# Patient Record
Sex: FEMALE | Race: WHITE | NOT HISPANIC OR LATINO | Employment: OTHER | RURAL
[De-identification: names, ages, dates, MRNs, and addresses within clinical notes are randomized per-mention and may not be internally consistent; named-entity substitution may affect disease eponyms.]

---

## 2020-04-01 ENCOUNTER — HISTORICAL (OUTPATIENT)
Dept: ADMINISTRATIVE | Facility: HOSPITAL | Age: 40
End: 2020-04-01

## 2020-04-21 ENCOUNTER — HISTORICAL (OUTPATIENT)
Dept: ADMINISTRATIVE | Facility: HOSPITAL | Age: 40
End: 2020-04-21

## 2020-08-21 ENCOUNTER — HISTORICAL (OUTPATIENT)
Dept: ADMINISTRATIVE | Facility: HOSPITAL | Age: 40
End: 2020-08-21

## 2020-09-03 ENCOUNTER — HISTORICAL (OUTPATIENT)
Dept: ADMINISTRATIVE | Facility: HOSPITAL | Age: 40
End: 2020-09-03

## 2022-07-21 ENCOUNTER — HOSPITAL ENCOUNTER (EMERGENCY)
Facility: HOSPITAL | Age: 42
Discharge: HOME | End: 2022-07-22
Attending: EMERGENCY MEDICINE
Payer: MEDICAID

## 2022-07-21 DIAGNOSIS — K59.04 CHRONIC IDIOPATHIC CONSTIPATION: ICD-10-CM

## 2022-07-21 DIAGNOSIS — N30.00 ACUTE CYSTITIS WITHOUT HEMATURIA: ICD-10-CM

## 2022-07-21 DIAGNOSIS — N20.0 BILATERAL KIDNEY STONES: ICD-10-CM

## 2022-07-21 DIAGNOSIS — R10.84 GENERALIZED ABDOMINAL PAIN: Primary | ICD-10-CM

## 2022-07-21 LAB
ALBUMIN SERPL BCP-MCNC: 2.7 G/DL (ref 3.5–5)
ALBUMIN/GLOB SERPL: 0.8 {RATIO}
ALP SERPL-CCNC: 146 U/L (ref 37–98)
ALT SERPL W P-5'-P-CCNC: 22 U/L (ref 13–56)
AMYLASE SERPL-CCNC: 16 U/L (ref 25–115)
ANION GAP SERPL CALCULATED.3IONS-SCNC: 10 MMOL/L (ref 7–16)
AST SERPL W P-5'-P-CCNC: 26 U/L (ref 15–37)
B-HCG UR QL: NEGATIVE
BACTERIA #/AREA URNS HPF: ABNORMAL /HPF
BASOPHILS # BLD AUTO: 0.04 K/UL (ref 0–0.2)
BASOPHILS NFR BLD AUTO: 0.7 % (ref 0–1)
BILIRUB SERPL-MCNC: 0.3 MG/DL (ref 0–1.2)
BILIRUB UR QL STRIP: NEGATIVE
BUN SERPL-MCNC: 10 MG/DL (ref 7–18)
BUN/CREAT SERPL: 18 (ref 6–20)
CALCIUM SERPL-MCNC: 8.2 MG/DL (ref 8.5–10.1)
CHLORIDE SERPL-SCNC: 104 MMOL/L (ref 98–107)
CLARITY UR: ABNORMAL
CO2 SERPL-SCNC: 28 MMOL/L (ref 21–32)
COLOR UR: YELLOW
CREAT SERPL-MCNC: 0.56 MG/DL (ref 0.55–1.02)
CTP QC/QA: YES
DIFFERENTIAL METHOD BLD: ABNORMAL
EOSINOPHIL # BLD AUTO: 0.06 K/UL (ref 0–0.5)
EOSINOPHIL NFR BLD AUTO: 1.1 % (ref 1–4)
ERYTHROCYTE [DISTWIDTH] IN BLOOD BY AUTOMATED COUNT: 13.4 % (ref 11.5–14.5)
GLOBULIN SER-MCNC: 3.3 G/DL (ref 2–4)
GLUCOSE SERPL-MCNC: 124 MG/DL (ref 74–106)
GLUCOSE UR STRIP-MCNC: NORMAL MG/DL
HCT VFR BLD AUTO: 38.3 % (ref 38–47)
HGB BLD-MCNC: 12.9 G/DL (ref 12–16)
IMM GRANULOCYTES # BLD AUTO: 0.06 K/UL (ref 0–0.04)
IMM GRANULOCYTES NFR BLD: 1.1 % (ref 0–0.4)
KETONES UR STRIP-SCNC: NEGATIVE MG/DL
LEUKOCYTE ESTERASE UR QL STRIP: ABNORMAL
LIPASE SERPL-CCNC: 95 U/L (ref 73–393)
LYMPHOCYTES # BLD AUTO: 0.85 K/UL (ref 1–4.8)
LYMPHOCYTES NFR BLD AUTO: 15.3 % (ref 27–41)
MCH RBC QN AUTO: 28.5 PG (ref 27–31)
MCHC RBC AUTO-ENTMCNC: 33.7 G/DL (ref 32–36)
MCV RBC AUTO: 84.5 FL (ref 80–96)
MONOCYTES # BLD AUTO: 0.33 K/UL (ref 0–0.8)
MONOCYTES NFR BLD AUTO: 5.9 % (ref 2–6)
MPC BLD CALC-MCNC: 13 FL (ref 9.4–12.4)
MUCOUS, UA: ABNORMAL /LPF
NEUTROPHILS # BLD AUTO: 4.21 K/UL (ref 1.8–7.7)
NEUTROPHILS NFR BLD AUTO: 75.9 % (ref 53–65)
NITRITE UR QL STRIP: POSITIVE
NRBC # BLD AUTO: 0 X10E3/UL
NRBC, AUTO (.00): 0 %
PH UR STRIP: 6.5 PH UNITS
PLATELET # BLD AUTO: 144 K/UL (ref 150–400)
POTASSIUM SERPL-SCNC: 3.5 MMOL/L (ref 3.5–5.1)
PROT SERPL-MCNC: 6 G/DL (ref 6.4–8.2)
PROT UR QL STRIP: NEGATIVE
RBC # BLD AUTO: 4.53 M/UL (ref 4.2–5.4)
RBC # UR STRIP: NEGATIVE /UL
RBC #/AREA URNS HPF: 1 /HPF
SODIUM SERPL-SCNC: 138 MMOL/L (ref 136–145)
SP GR UR STRIP: 1.01
SQUAMOUS #/AREA URNS LPF: ABNORMAL /HPF
UROBILINOGEN UR STRIP-ACNC: NORMAL MG/DL
WBC # BLD AUTO: 5.55 K/UL (ref 4.5–11)
WBC #/AREA URNS HPF: 5 /HPF

## 2022-07-21 PROCEDURE — 80053 COMPREHEN METABOLIC PANEL: CPT | Performed by: EMERGENCY MEDICINE

## 2022-07-21 PROCEDURE — 99285 EMERGENCY DEPT VISIT HI MDM: CPT | Mod: 25

## 2022-07-21 PROCEDURE — 85025 COMPLETE CBC W/AUTO DIFF WBC: CPT | Performed by: EMERGENCY MEDICINE

## 2022-07-21 PROCEDURE — 99283 PR EMERGENCY DEPT VISIT,LEVEL III: ICD-10-PCS | Mod: ,,, | Performed by: EMERGENCY MEDICINE

## 2022-07-21 PROCEDURE — 81001 URINALYSIS AUTO W/SCOPE: CPT | Mod: 59 | Performed by: EMERGENCY MEDICINE

## 2022-07-21 PROCEDURE — 25000003 PHARM REV CODE 250: Performed by: EMERGENCY MEDICINE

## 2022-07-21 PROCEDURE — 96365 THER/PROPH/DIAG IV INF INIT: CPT

## 2022-07-21 PROCEDURE — 80307 DRUG TEST PRSMV CHEM ANLYZR: CPT | Performed by: EMERGENCY MEDICINE

## 2022-07-21 PROCEDURE — 83690 ASSAY OF LIPASE: CPT | Performed by: EMERGENCY MEDICINE

## 2022-07-21 PROCEDURE — 99283 EMERGENCY DEPT VISIT LOW MDM: CPT | Mod: ,,, | Performed by: EMERGENCY MEDICINE

## 2022-07-21 PROCEDURE — 82150 ASSAY OF AMYLASE: CPT | Performed by: EMERGENCY MEDICINE

## 2022-07-21 PROCEDURE — 63600175 PHARM REV CODE 636 W HCPCS: Performed by: EMERGENCY MEDICINE

## 2022-07-21 PROCEDURE — 96375 TX/PRO/DX INJ NEW DRUG ADDON: CPT

## 2022-07-21 PROCEDURE — 81025 URINE PREGNANCY TEST: CPT | Performed by: EMERGENCY MEDICINE

## 2022-07-21 PROCEDURE — 36415 COLL VENOUS BLD VENIPUNCTURE: CPT | Performed by: EMERGENCY MEDICINE

## 2022-07-21 RX ORDER — ACETAMINOPHEN 500 MG
1000 TABLET ORAL
Status: COMPLETED | OUTPATIENT
Start: 2022-07-21 | End: 2022-07-21

## 2022-07-21 RX ORDER — ONDANSETRON 2 MG/ML
8 INJECTION INTRAMUSCULAR; INTRAVENOUS
Status: COMPLETED | OUTPATIENT
Start: 2022-07-21 | End: 2022-07-21

## 2022-07-21 RX ORDER — HYDROCHLOROTHIAZIDE 25 MG/1
25 TABLET ORAL DAILY
COMMUNITY
End: 2023-06-29 | Stop reason: SDUPTHER

## 2022-07-21 RX ORDER — POLYETHYLENE GLYCOL 3350 17 G/17G
17 POWDER, FOR SOLUTION ORAL ONCE
Status: COMPLETED | OUTPATIENT
Start: 2022-07-21 | End: 2022-07-21

## 2022-07-21 RX ORDER — DOCUSATE SODIUM 100 MG/1
100 CAPSULE, LIQUID FILLED ORAL ONCE
Status: COMPLETED | OUTPATIENT
Start: 2022-07-21 | End: 2022-07-21

## 2022-07-21 RX ADMIN — ONDANSETRON 8 MG: 2 INJECTION INTRAMUSCULAR; INTRAVENOUS at 09:07

## 2022-07-21 RX ADMIN — ACETAMINOPHEN 1000 MG: 500 TABLET ORAL at 09:07

## 2022-07-21 RX ADMIN — POLYETHYLENE GLYCOL 3350 17 G: 17 POWDER, FOR SOLUTION ORAL at 11:07

## 2022-07-21 RX ADMIN — DOCUSATE SODIUM 100 MG: 100 CAPSULE, LIQUID FILLED ORAL at 11:07

## 2022-07-22 ENCOUNTER — TELEPHONE (OUTPATIENT)
Dept: EMERGENCY MEDICINE | Facility: HOSPITAL | Age: 42
End: 2022-07-22
Payer: MEDICAID

## 2022-07-22 VITALS
HEART RATE: 100 BPM | TEMPERATURE: 99 F | DIASTOLIC BLOOD PRESSURE: 81 MMHG | HEIGHT: 62 IN | BODY MASS INDEX: 47.66 KG/M2 | SYSTOLIC BLOOD PRESSURE: 123 MMHG | WEIGHT: 259 LBS | RESPIRATION RATE: 20 BRPM | OXYGEN SATURATION: 97 %

## 2022-07-22 LAB
AMPHET UR QL SCN: POSITIVE
BARBITURATES UR QL SCN: NEGATIVE
BENZODIAZ METAB UR QL SCN: NEGATIVE
CANNABINOIDS UR QL SCN: NEGATIVE
COCAINE UR QL SCN: NEGATIVE
OPIATES UR QL SCN: NEGATIVE
PCP UR QL SCN: NEGATIVE

## 2022-07-22 PROCEDURE — 25000003 PHARM REV CODE 250: Performed by: EMERGENCY MEDICINE

## 2022-07-22 PROCEDURE — 63600175 PHARM REV CODE 636 W HCPCS: Performed by: EMERGENCY MEDICINE

## 2022-07-22 RX ORDER — DOCUSATE SODIUM 100 MG/1
100 CAPSULE, LIQUID FILLED ORAL 2 TIMES DAILY PRN
Qty: 60 CAPSULE | Refills: 0 | Status: SHIPPED | OUTPATIENT
Start: 2022-07-22 | End: 2022-08-21

## 2022-07-22 RX ORDER — ONDANSETRON 4 MG/1
8 TABLET, ORALLY DISINTEGRATING ORAL EVERY 8 HOURS PRN
Qty: 15 TABLET | Refills: 0 | Status: SHIPPED | OUTPATIENT
Start: 2022-07-22 | End: 2022-07-27

## 2022-07-22 RX ADMIN — CEFTRIAXONE SODIUM 1 G: 1 INJECTION, POWDER, FOR SOLUTION INTRAMUSCULAR; INTRAVENOUS at 12:07

## 2022-07-22 NOTE — ED PROVIDER NOTES
Encounter Date: 7/21/2022       History     Chief Complaint   Patient presents with    Abdominal Pain     X 4 days     Constipation     X 3 days - ems called - denies trauma      41 y.o. female presented to the ED via EMS c/o generalized abdominal pain since 3 days. Pt reports she has not had a bowel movement in 4 days and does have problems with chronic constipation. Pt reports the pain feels like the pain she had when she had kidney stones in the past. PT denies any hematuria but reports dysuria. PT denies any chest pain, vomiting, fever, SOB, sore throat, runny nose, or any other complaints at this time.         Review of patient's allergies indicates:  No Known Allergies  Past Medical History:   Diagnosis Date    Hypertension      History reviewed. No pertinent surgical history.  History reviewed. No pertinent family history.  Social History     Tobacco Use    Smoking status: Current Every Day Smoker    Smokeless tobacco: Never Used   Substance Use Topics    Alcohol use: Not Currently     Review of Systems   Constitutional: Negative for chills and fever.   HENT: Negative for congestion, hearing loss and trouble swallowing.    Eyes: Negative for visual disturbance.   Respiratory: Negative for cough and shortness of breath.    Cardiovascular: Negative for chest pain, palpitations and leg swelling.   Gastrointestinal: Positive for abdominal pain, constipation and nausea. Negative for blood in stool, diarrhea and vomiting.   Genitourinary: Positive for dysuria. Negative for difficulty urinating and hematuria.   Musculoskeletal: Negative for back pain and myalgias.   Skin: Negative for rash.   Neurological: Negative for dizziness, light-headedness and headaches.   Psychiatric/Behavioral: Negative for sleep disturbance. The patient is not nervous/anxious.        Physical Exam     Initial Vitals   BP Pulse Resp Temp SpO2   07/21/22 2043 07/21/22 2043 07/21/22 2052 07/21/22 2047 07/21/22 2043   131/84 (!) 111 (!)  26 98.7 °F (37.1 °C) 95 %      MAP       --                Physical Exam    Vitals reviewed.  Constitutional: She appears well-developed and well-nourished. She is not diaphoretic. No distress.   HENT:   Head: Normocephalic and atraumatic.   Right Ear: External ear normal.   Left Ear: External ear normal.   Nose: Nose normal.   Mouth/Throat: Oropharynx is clear and moist.   Eyes: Conjunctivae and EOM are normal.   Neck: Neck supple.   Normal range of motion.  Cardiovascular: Normal rate and regular rhythm.   Pulmonary/Chest: Breath sounds normal. No respiratory distress.   Abdominal: Abdomen is soft. Bowel sounds are normal. She exhibits no distension. There is abdominal tenderness (generalized to palpation). There is no rebound and no guarding.   Genitourinary:    Pelvic exam was performed with patient supine.   There is no rash, tenderness, lesion or injury on the right labia. There is no rash, tenderness, lesion or injury on the left labia.    No vaginal discharge.     Musculoskeletal:         General: No edema. Normal range of motion.      Cervical back: Normal range of motion and neck supple.     Neurological: She is alert and oriented to person, place, and time. She has normal strength. No sensory deficit. GCS score is 15. GCS eye subscore is 4. GCS verbal subscore is 5. GCS motor subscore is 6.   Skin: Skin is warm and dry. Capillary refill takes less than 2 seconds.   Psychiatric: She has a normal mood and affect. Her behavior is normal. Judgment and thought content normal.         Medical Screening Exam   See Full Note    ED Course   Procedures  Labs Reviewed   URINALYSIS, REFLEX TO URINE CULTURE - Abnormal; Notable for the following components:       Result Value    Leukocytes, UA Small (*)     Nitrites, UA Positive (*)     All other components within normal limits   COMPREHENSIVE METABOLIC PANEL - Abnormal; Notable for the following components:    Glucose 124 (*)     Calcium 8.2 (*)     Total Protein 6.0  (*)     Albumin 2.7 (*)     Alk Phos 146 (*)     All other components within normal limits   AMYLASE - Abnormal; Notable for the following components:    Amylase 16 (*)     All other components within normal limits   CBC WITH DIFFERENTIAL - Abnormal; Notable for the following components:    Platelet Count 144 (*)     MPV 13.0 (*)     Neutrophils % 75.9 (*)     Lymphocytes % 15.3 (*)     Immature Granulocytes % 1.1 (*)     Lymphocytes, Absolute 0.85 (*)     Immature Granulocytes, Absolute 0.06 (*)     All other components within normal limits   DRUG SCREEN, URINE (BEAKER) - Abnormal; Notable for the following components:    Amphetamine Positive (*)     All other components within normal limits    Narrative:     The results of screening tests should be considered presumptive. Confirmatory testing is available upon request.    Cutoff Points:  PCP:         25ng/mL  AMPH:        500ng/mL  JULIUS:        200ng/mL  ANGELICA:        200ng/mL  THC:         50ng/mL  ERIKA:         300ng/mL  OPI:         2000ng/mL   URINALYSIS, MICROSCOPIC - Abnormal; Notable for the following components:    Bacteria, UA Occasional (*)     Squamous Epithelial Cells, UA Occasional (*)     Mucous Occasional (*)     All other components within normal limits   LIPASE - Normal   CBC W/ AUTO DIFFERENTIAL    Narrative:     The following orders were created for panel order CBC auto differential.  Procedure                               Abnormality         Status                     ---------                               -----------         ------                     CBC with Differential[[id number]]        Abnormal            Final result                 Please view results for these tests on the individual orders.   EXTRA TUBES    Narrative:     The following orders were created for panel order EXTRA TUBES.  Procedure                               Abnormality         Status                     ---------                               -----------          ------                     Light Blue Top Hold[[id number]]                              In process                 Light Green Top Hold[[id number]]                             In process                 Lavender Top Hold[[id number]]                                In process                 Gold Top Hold[[id number]]                                    In process                   Please view results for these tests on the individual orders.   LIGHT BLUE TOP HOLD   LIGHT GREEN TOP HOLD   LAVENDER TOP HOLD   GOLD TOP HOLD   POCT URINE PREGNANCY          Imaging Results          CT Renal Stone Study ABD Pelvis WO (In process)  Result time 07/21/22 21:49:02                 Medications   cefTRIAXone (ROCEPHIN) 1 g in dextrose 5 % in water (D5W) 5 % 50 mL IVPB (MB+) (1 g Intravenous New Bag 7/22/22 0014)   acetaminophen tablet 1,000 mg (1,000 mg Oral Given 7/21/22 2139)   ondansetron injection 8 mg (8 mg Intravenous Given 7/21/22 2139)   polyethylene glycol packet 17 g (17 g Oral Given 7/21/22 2333)   docusate sodium capsule 100 mg (100 mg Oral Given 7/21/22 2333)        Additional MDM:   Comments: Make sure to drink plenty of fluids and eat foods with high fiber. Take miralax and colace everyday until soft stools. Take tylenol OTC for renal colic. Make sure to follow up with your PCP Dr. Burrell in 3-5 days. Return to the ED if new or worsening symptoms onset. .          Attending Attestation:   Physician Attestation Statement for Resident:  As the supervising MD  I agree with the above history. -:   As the supervising MD I agree with the above PE.    As the supervising MD I agree with the above treatment, course, plan, and disposition.                      Clinical Impression:   Final diagnoses:  [R10.84] Generalized abdominal pain (Primary)  [K59.04] Chronic idiopathic constipation  [N20.0] Bilateral kidney stones  [N30.00] Acute cystitis without hematuria          ED Disposition Condition    Discharge Stable        ED  Prescriptions     Medication Sig Dispense Start Date End Date Auth. Provider    ondansetron (ZOFRAN-ODT) 4 MG TbDL Take 2 tablets (8 mg total) by mouth every 8 (eight) hours as needed (nausea). 15 tablet 7/22/2022 7/27/2022 Maryjo Martinez DO    docusate sodium (COLACE) 100 MG capsule Take 1 capsule (100 mg total) by mouth 2 (two) times daily as needed for Constipation. 60 capsule 7/22/2022 8/21/2022 Maryoj Martinez DO        Follow-up Information     Follow up With Specialties Details Why Contact Info    PCP Dr. Burrell  Schedule an appointment as soon as possible for a visit in 3 days As needed, If symptoms worsen            Maryjo Martinez DO  Resident  07/22/22 0013       Scar Koo MD  07/22/22 0029

## 2022-07-22 NOTE — DISCHARGE INSTRUCTIONS
Make sure to drink plenty of fluids and eat foods with high fiber. Take miralax and colace everyday until soft stools. Take tylenol OTC for renal colic. Make sure to follow up with your PCP Dr. Burrell in 3-5 days. Return to the ED if new or worsening symptoms onset.

## 2023-06-29 ENCOUNTER — OFFICE VISIT (OUTPATIENT)
Dept: FAMILY MEDICINE | Facility: CLINIC | Age: 43
End: 2023-06-29
Payer: MEDICAID

## 2023-06-29 VITALS
RESPIRATION RATE: 18 BRPM | DIASTOLIC BLOOD PRESSURE: 78 MMHG | TEMPERATURE: 99 F | HEIGHT: 62 IN | WEIGHT: 246 LBS | SYSTOLIC BLOOD PRESSURE: 124 MMHG | BODY MASS INDEX: 45.27 KG/M2 | HEART RATE: 126 BPM

## 2023-06-29 DIAGNOSIS — Z12.31 ENCOUNTER FOR SCREENING MAMMOGRAM FOR MALIGNANT NEOPLASM OF BREAST: ICD-10-CM

## 2023-06-29 DIAGNOSIS — Z86.39 HX OF DIABETES MELLITUS: ICD-10-CM

## 2023-06-29 DIAGNOSIS — I10 HYPERTENSION, UNSPECIFIED TYPE: Primary | ICD-10-CM

## 2023-06-29 LAB
ALBUMIN SERPL BCP-MCNC: 4.1 G/DL (ref 3.5–5)
ALBUMIN/GLOB SERPL: 0.9 {RATIO}
ALP SERPL-CCNC: 165 U/L (ref 37–98)
ALT SERPL W P-5'-P-CCNC: 30 U/L (ref 13–56)
ANION GAP SERPL CALCULATED.3IONS-SCNC: 11 MMOL/L (ref 7–16)
AST SERPL W P-5'-P-CCNC: 17 U/L (ref 15–37)
BASOPHILS # BLD AUTO: 0.13 K/UL (ref 0–0.2)
BASOPHILS NFR BLD AUTO: 1.2 % (ref 0–1)
BILIRUB SERPL-MCNC: 0.4 MG/DL (ref ?–1.2)
BUN SERPL-MCNC: 16 MG/DL (ref 7–18)
BUN/CREAT SERPL: 16 (ref 6–20)
CALCIUM SERPL-MCNC: 9.7 MG/DL (ref 8.5–10.1)
CHLORIDE SERPL-SCNC: 106 MMOL/L (ref 98–107)
CHOLEST SERPL-MCNC: 195 MG/DL (ref 0–200)
CHOLEST/HDLC SERPL: 4.4 {RATIO}
CO2 SERPL-SCNC: 28 MMOL/L (ref 21–32)
CREAT SERPL-MCNC: 1.02 MG/DL (ref 0.55–1.02)
CREAT UR-MCNC: 222 MG/DL (ref 28–219)
DIFFERENTIAL METHOD BLD: ABNORMAL
EGFR (NO RACE VARIABLE) (RUSH/TITUS): 71 ML/MIN/1.73M2
EOSINOPHIL # BLD AUTO: 0.42 K/UL (ref 0–0.5)
EOSINOPHIL NFR BLD AUTO: 3.8 % (ref 1–4)
ERYTHROCYTE [DISTWIDTH] IN BLOOD BY AUTOMATED COUNT: 13.2 % (ref 11.5–14.5)
EST. AVERAGE GLUCOSE BLD GHB EST-MCNC: 130 MG/DL
GLOBULIN SER-MCNC: 4.4 G/DL (ref 2–4)
GLUCOSE SERPL-MCNC: 126 MG/DL (ref 74–106)
HBA1C MFR BLD HPLC: 6.5 % (ref 4.5–6.6)
HCT VFR BLD AUTO: 44.6 % (ref 38–47)
HDLC SERPL-MCNC: 44 MG/DL (ref 40–60)
HGB BLD-MCNC: 14.3 G/DL (ref 12–16)
IMM GRANULOCYTES # BLD AUTO: 0.03 K/UL (ref 0–0.04)
IMM GRANULOCYTES NFR BLD: 0.3 % (ref 0–0.4)
LDLC SERPL CALC-MCNC: 99 MG/DL
LDLC/HDLC SERPL: 2.3 {RATIO}
LYMPHOCYTES # BLD AUTO: 3.27 K/UL (ref 1–4.8)
LYMPHOCYTES NFR BLD AUTO: 29.8 % (ref 27–41)
MCH RBC QN AUTO: 28 PG (ref 27–31)
MCHC RBC AUTO-ENTMCNC: 32.1 G/DL (ref 32–36)
MCV RBC AUTO: 87.5 FL (ref 80–96)
MICROALBUMIN UR-MCNC: 9.6 MG/DL (ref 0–2.8)
MICROALBUMIN/CREAT RATIO PNL UR: 43.2 MG/G (ref 0–30)
MONOCYTES # BLD AUTO: 0.81 K/UL (ref 0–0.8)
MONOCYTES NFR BLD AUTO: 7.4 % (ref 2–6)
MPC BLD CALC-MCNC: 12.1 FL (ref 9.4–12.4)
NEUTROPHILS # BLD AUTO: 6.32 K/UL (ref 1.8–7.7)
NEUTROPHILS NFR BLD AUTO: 57.5 % (ref 53–65)
NONHDLC SERPL-MCNC: 151 MG/DL
NRBC # BLD AUTO: 0 X10E3/UL
NRBC, AUTO (.00): 0 %
PLATELET # BLD AUTO: 314 K/UL (ref 150–400)
POTASSIUM SERPL-SCNC: 3.4 MMOL/L (ref 3.5–5.1)
PROT SERPL-MCNC: 8.5 G/DL (ref 6.4–8.2)
RBC # BLD AUTO: 5.1 M/UL (ref 4.2–5.4)
SODIUM SERPL-SCNC: 142 MMOL/L (ref 136–145)
TRIGL SERPL-MCNC: 259 MG/DL (ref 35–150)
VLDLC SERPL-MCNC: 52 MG/DL
WBC # BLD AUTO: 10.98 K/UL (ref 4.5–11)

## 2023-06-29 PROCEDURE — 3008F BODY MASS INDEX DOCD: CPT | Mod: CPTII,,, | Performed by: NURSE PRACTITIONER

## 2023-06-29 PROCEDURE — 99214 OFFICE O/P EST MOD 30 MIN: CPT | Mod: ,,, | Performed by: NURSE PRACTITIONER

## 2023-06-29 PROCEDURE — 1160F RVW MEDS BY RX/DR IN RCRD: CPT | Mod: CPTII,,, | Performed by: NURSE PRACTITIONER

## 2023-06-29 PROCEDURE — 83036 HEMOGLOBIN GLYCOSYLATED A1C: CPT | Mod: ,,, | Performed by: CLINICAL MEDICAL LABORATORY

## 2023-06-29 PROCEDURE — 82570 MICROALBUMIN / CREATININE RATIO URINE: ICD-10-PCS | Mod: ,,, | Performed by: CLINICAL MEDICAL LABORATORY

## 2023-06-29 PROCEDURE — 80061 LIPID PANEL: CPT | Mod: ,,, | Performed by: CLINICAL MEDICAL LABORATORY

## 2023-06-29 PROCEDURE — 1159F MED LIST DOCD IN RCRD: CPT | Mod: CPTII,,, | Performed by: NURSE PRACTITIONER

## 2023-06-29 PROCEDURE — 82043 UR ALBUMIN QUANTITATIVE: CPT | Mod: ,,, | Performed by: CLINICAL MEDICAL LABORATORY

## 2023-06-29 PROCEDURE — 3074F PR MOST RECENT SYSTOLIC BLOOD PRESSURE < 130 MM HG: ICD-10-PCS | Mod: CPTII,,, | Performed by: NURSE PRACTITIONER

## 2023-06-29 PROCEDURE — 3074F SYST BP LT 130 MM HG: CPT | Mod: CPTII,,, | Performed by: NURSE PRACTITIONER

## 2023-06-29 PROCEDURE — 85025 COMPLETE CBC W/AUTO DIFF WBC: CPT | Mod: ,,, | Performed by: CLINICAL MEDICAL LABORATORY

## 2023-06-29 PROCEDURE — 85025 CBC WITH DIFFERENTIAL: ICD-10-PCS | Mod: ,,, | Performed by: CLINICAL MEDICAL LABORATORY

## 2023-06-29 PROCEDURE — 80053 COMPREHENSIVE METABOLIC PANEL: ICD-10-PCS | Mod: ,,, | Performed by: CLINICAL MEDICAL LABORATORY

## 2023-06-29 PROCEDURE — 1160F PR REVIEW ALL MEDS BY PRESCRIBER/CLIN PHARMACIST DOCUMENTED: ICD-10-PCS | Mod: CPTII,,, | Performed by: NURSE PRACTITIONER

## 2023-06-29 PROCEDURE — 83036 HEMOGLOBIN A1C: ICD-10-PCS | Mod: ,,, | Performed by: CLINICAL MEDICAL LABORATORY

## 2023-06-29 PROCEDURE — 3008F PR BODY MASS INDEX (BMI) DOCUMENTED: ICD-10-PCS | Mod: CPTII,,, | Performed by: NURSE PRACTITIONER

## 2023-06-29 PROCEDURE — 3078F PR MOST RECENT DIASTOLIC BLOOD PRESSURE < 80 MM HG: ICD-10-PCS | Mod: CPTII,,, | Performed by: NURSE PRACTITIONER

## 2023-06-29 PROCEDURE — 99214 PR OFFICE/OUTPT VISIT, EST, LEVL IV, 30-39 MIN: ICD-10-PCS | Mod: ,,, | Performed by: NURSE PRACTITIONER

## 2023-06-29 PROCEDURE — 82043 MICROALBUMIN / CREATININE RATIO URINE: ICD-10-PCS | Mod: ,,, | Performed by: CLINICAL MEDICAL LABORATORY

## 2023-06-29 PROCEDURE — 3078F DIAST BP <80 MM HG: CPT | Mod: CPTII,,, | Performed by: NURSE PRACTITIONER

## 2023-06-29 PROCEDURE — 82570 ASSAY OF URINE CREATININE: CPT | Mod: ,,, | Performed by: CLINICAL MEDICAL LABORATORY

## 2023-06-29 PROCEDURE — 80061 LIPID PANEL: ICD-10-PCS | Mod: ,,, | Performed by: CLINICAL MEDICAL LABORATORY

## 2023-06-29 PROCEDURE — 1159F PR MEDICATION LIST DOCUMENTED IN MEDICAL RECORD: ICD-10-PCS | Mod: CPTII,,, | Performed by: NURSE PRACTITIONER

## 2023-06-29 PROCEDURE — 80053 COMPREHEN METABOLIC PANEL: CPT | Mod: ,,, | Performed by: CLINICAL MEDICAL LABORATORY

## 2023-06-29 RX ORDER — HYDROCHLOROTHIAZIDE 25 MG/1
25 TABLET ORAL DAILY
Qty: 30 TABLET | Refills: 2 | Status: SHIPPED | OUTPATIENT
Start: 2023-06-29

## 2023-06-29 NOTE — Clinical Note
Hepatitis C Screening Decline Lipid Panel Awaiting completion COVID-19 Vaccine(1) Decline Pneumococcal Vaccines (Age 0-64)(1 - PCV) Decline HIV Screening Decline TETANUS VACCINE decline Mammogram To be scheduled Hemoglobin A1c Waiting Completion
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
## 2023-12-14 ENCOUNTER — HOSPITAL ENCOUNTER (EMERGENCY)
Facility: HOSPITAL | Age: 43
Discharge: HOME | End: 2023-12-14
Payer: MEDICAID

## 2023-12-14 VITALS
WEIGHT: 250 LBS | HEART RATE: 105 BPM | SYSTOLIC BLOOD PRESSURE: 146 MMHG | HEIGHT: 62 IN | TEMPERATURE: 99 F | RESPIRATION RATE: 18 BRPM | BODY MASS INDEX: 46.01 KG/M2 | DIASTOLIC BLOOD PRESSURE: 86 MMHG | OXYGEN SATURATION: 96 %
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
## 2023-12-14 PROCEDURE — 99284 PR EMERGENCY DEPT VISIT,LEVEL IV: ICD-10-PCS | Mod: 25,,, | Performed by: REGISTERED NURSE

## 2023-12-14 PROCEDURE — 12031 INTMD RPR S/A/T/EXT 2.5 CM/<: CPT

## 2023-12-14 PROCEDURE — 99283 EMERGENCY DEPT VISIT LOW MDM: CPT | Mod: 25

## 2023-12-14 PROCEDURE — 25000003 PHARM REV CODE 250: Performed by: REGISTERED NURSE

## 2023-12-14 PROCEDURE — 12001 PR RESUPERF WND BODY <2.5CM: ICD-10-PCS | Mod: ,,, | Performed by: REGISTERED NURSE

## 2023-12-14 PROCEDURE — 99284 EMERGENCY DEPT VISIT MOD MDM: CPT | Mod: 25,,, | Performed by: REGISTERED NURSE

## 2023-12-14 PROCEDURE — 12001 RPR S/N/AX/GEN/TRNK 2.5CM/<: CPT | Mod: ,,, | Performed by: REGISTERED NURSE

## 2023-12-14 RX ORDER — AMOXICILLIN AND CLAVULANATE POTASSIUM 875; 125 MG/1; MG/1
1 TABLET, FILM COATED ORAL 2 TIMES DAILY
Qty: 14 TABLET | Refills: 0 | Status: SHIPPED | OUTPATIENT
Start: 2023-12-14

## 2023-12-14 RX ORDER — LIDOCAINE HYDROCHLORIDE 10 MG/ML
1 INJECTION INFILTRATION; PERINEURAL
Status: COMPLETED | OUTPATIENT
Start: 2023-12-14 | End: 2023-12-14

## 2023-12-14 RX ADMIN — LIDOCAINE HYDROCHLORIDE 1 ML: 10 INJECTION, SOLUTION INFILTRATION; PERINEURAL at 11:12

## 2023-12-14 RX ADMIN — BACITRACIN ZINC, NEOMYCIN, POLYMYXIN B 1 EACH: 400; 3.5; 5 OINTMENT TOPICAL at 11:12

## 2023-12-14 NOTE — ED PROVIDER NOTES
Encounter Date: 12/14/2023       History     Chief Complaint   Patient presents with    Animal Bite     43 y-old  female received to ED with complaint of dog bite. States that she was attempting to break up a dog fight when she was bitten in her right upper arm by her family dog. Dog is UTD on shots. Patient is UTD on TDAP. Pain is rated as a 10 on a 0-10 point scale to her right upper arm. No other injury reported.       Review of patient's allergies indicates:   Allergen Reactions    Sulfa (sulfonamide antibiotics)      Past Medical History:   Diagnosis Date    Diabetes mellitus, type 2     Hypertension      History reviewed. No pertinent surgical history.  History reviewed. No pertinent family history.  Social History     Tobacco Use    Smoking status: Every Day     Passive exposure: Current    Smokeless tobacco: Never   Substance Use Topics    Alcohol use: Not Currently    Drug use: Never     Review of Systems   Constitutional: Negative.    HENT: Negative.     Eyes: Negative.    Respiratory: Negative.     Cardiovascular: Negative.    Gastrointestinal: Negative.    Endocrine: Negative.    Genitourinary: Negative.    Musculoskeletal: Negative.    Skin:  Positive for wound.   Allergic/Immunologic: Negative.    Neurological: Negative.    Hematological: Negative.    Psychiatric/Behavioral: Negative.         Physical Exam     Initial Vitals [12/14/23 1053]   BP Pulse Resp Temp SpO2   (!) 146/86 105 18 98.8 °F (37.1 °C) 96 %      MAP       --         Physical Exam    Nursing note and vitals reviewed.  Constitutional: She appears well-developed and well-nourished.   HENT:   Head: Normocephalic and atraumatic.   Right Ear: External ear normal.   Left Ear: External ear normal.   Nose: Nose normal.   Mouth/Throat: Oropharynx is clear and moist.   Eyes: Conjunctivae are normal.   Neck: Neck supple.   Normal range of motion.  Cardiovascular:  Normal rate, regular rhythm, normal heart sounds and intact distal pulses.            Pulmonary/Chest: Breath sounds normal.   Abdominal: Abdomen is soft. Bowel sounds are normal.   Musculoskeletal:         General: Normal range of motion.      Cervical back: Normal range of motion and neck supple.     Neurological: She is alert and oriented to person, place, and time. She has normal strength. GCS score is 15. GCS eye subscore is 4. GCS verbal subscore is 5. GCS motor subscore is 6.   Skin: Skin is warm and dry. Capillary refill takes less than 2 seconds.        Psychiatric: She has a normal mood and affect. Her behavior is normal. Judgment and thought content normal.         Medical Screening Exam   See Full Note    ED Course   Lac Repair    Date/Time: 12/14/2023 11:29 AM    Performed by: Madi Corona NP-C  Authorized by: Madi Corona NP-C    Consent:     Consent obtained:  Verbal    Consent given by:  Patient    Risks discussed:  Infection, pain, retained foreign body and need for additional repair  Universal protocol:     Procedure explained and questions answered to patient or proxy's satisfaction: yes      Relevant documents present and verified: yes      Test results available: yes      Imaging studies available: yes      Required blood products, implants, devices, and special equipment available: yes      Site/side marked: yes      Immediately prior to procedure, a time out was called: yes      Patient identity confirmed:  Verbally with patient, hospital-assigned identification number and arm band  Anesthesia:     Anesthesia method:  Local infiltration    Local anesthetic:  Lidocaine 1% w/o epi  Laceration details:     Location:  Shoulder/arm    Shoulder/arm location:  R upper arm    Length (cm):  0.5    Depth (mm):  0.5  Pre-procedure details:     Preparation:  Patient was prepped and draped in usual sterile fashion and imaging obtained to evaluate for foreign bodies  Exploration:     Limited defect created (wound extended): no      Hemostasis achieved with:  Direct pressure     Imaging obtained: x-ray      Imaging outcome: foreign body not noted      Wound exploration: wound explored through full range of motion and entire depth of wound visualized      Contaminated: no    Treatment:     Area cleansed with:  Povidone-iodine and saline    Amount of cleaning:  Extensive    Irrigation solution:  Sterile saline    Irrigation volume:  100    Irrigation method:  Pressure wash    Visualized foreign bodies/material removed: no    Skin repair:     Repair method:  Sutures    Suture size:  4-0    Suture material:  Nylon    Suture technique:  Simple interrupted    Number of sutures:  1  Approximation:     Approximation:  Close  Repair type:     Repair type:  Simple  Post-procedure details:     Dressing:  Antibiotic ointment    Procedure completion:  Tolerated well, no immediate complications    Labs Reviewed - No data to display       Imaging Results              X-Ray Elbow Complete Right (In process)                      X-Ray Humerus 2 View Right (In process)                      Medications   LIDOcaine HCL 10 mg/ml (1%) injection 1 mL (1 mL Other Given 12/14/23 1106)   neomycin-bacitracnZn-polymyxnB packet (1 each Topical (Top) Given 12/14/23 1106)     Medical Decision Making  43 y-old  female received to ED with complaint of dog bite. States that she was attempting to break up a dog fight when she was bitten in her right upper arm by her family dog. Dog is UTD on shots. Patient is UTD on TDAP. Pain is rated as a 10 on a 0-10 point scale to her right upper arm. No other injury reported.       Amount and/or Complexity of Data Reviewed  Radiology: ordered.                                      Clinical Impression:   Final diagnoses:  [W54.0XXA] Dog bite (Primary)        ED Disposition Condition    Discharge Stable          ED Prescriptions       Medication Sig Dispense Start Date End Date Auth. Provider    amoxicillin-clavulanate 875-125mg (AUGMENTIN) 875-125 mg per tablet Take 1 tablet by  mouth 2 (two) times daily. 14 tablet 12/14/2023 -- Madi Corona NP-C          Follow-up Information       Follow up With Specialties Details Why Contact Info    [facility] - Emergency Department Emergency Medicine In 2 days For wound re-check [address]  [facility] [id number]  [phone]             Madi Corona NP-C  12/14/23 1135

## 2023-12-14 NOTE — DISCHARGE INSTRUCTIONS
Return here in 2 days for a wound re-check. Take antibiotics as prescribed. Sutures out in 10 days.

## 2023-12-14 NOTE — ED TRIAGE NOTES
Patient arrived to the ER c/o of dog bite that happened 20 minutes ago. PT stated she was trying to break up a fight when it bite her. Small area noted to the right arm. Patient is able to move arm and fingers. Pt stated the dog is up to date on vaccinations.

## 2023-12-16 ENCOUNTER — HOSPITAL ENCOUNTER (EMERGENCY)
Facility: HOSPITAL | Age: 43
Discharge: HOME | End: 2023-12-16
Payer: MEDICAID

## 2023-12-16 VITALS
BODY MASS INDEX: 46.01 KG/M2 | OXYGEN SATURATION: 95 % | HEIGHT: 62 IN | WEIGHT: 250 LBS | SYSTOLIC BLOOD PRESSURE: 129 MMHG | RESPIRATION RATE: 18 BRPM | HEART RATE: 102 BPM | TEMPERATURE: 98 F | DIASTOLIC BLOOD PRESSURE: 75 MMHG

## 2023-12-16 DIAGNOSIS — Z51.89 VISIT FOR WOUND CHECK: Primary | ICD-10-CM

## 2023-12-16 PROCEDURE — 99283 EMERGENCY DEPT VISIT LOW MDM: CPT

## 2023-12-16 PROCEDURE — 99024 PR POST-OP FOLLOW-UP VISIT: ICD-10-PCS | Mod: ,,, | Performed by: NURSE PRACTITIONER

## 2023-12-16 PROCEDURE — 99024 POSTOP FOLLOW-UP VISIT: CPT | Mod: ,,, | Performed by: NURSE PRACTITIONER

## 2023-12-16 RX ORDER — NAPROXEN 500 MG/1
500 TABLET ORAL 2 TIMES DAILY WITH MEALS
Qty: 20 TABLET | Refills: 0 | Status: SHIPPED | OUTPATIENT
Start: 2023-12-16

## 2023-12-16 NOTE — DISCHARGE INSTRUCTIONS
-Cleanse in shower with dial soap, warm water, rinse well and pat dry. May cover with dressing if drainage occurs.    -Complete Augmentin as previously directed.

## 2023-12-16 NOTE — ED PROVIDER NOTES
Encounter Date: 12/16/2023       History     Chief Complaint   Patient presents with    Wound Check     42 y/o WF arrived to the ED via POV for 2 day wound check s/p dog bite. Has been taking Augmentin as directed. Patient accidentally hit right arm last night and had bleeding. Denies any bleeding at present. Denies redness or fever. Has been taking Tylenol over the counter for pain, but is not helping.Rates pain 5/10.        The history is provided by the patient.     Review of patient's allergies indicates:   Allergen Reactions    Sulfa (sulfonamide antibiotics)      Past Medical History:   Diagnosis Date    Diabetes mellitus, type 2     Hypertension      No past surgical history on file.  History reviewed. No pertinent family history.  Social History     Tobacco Use    Smoking status: Every Day     Passive exposure: Current    Smokeless tobacco: Never   Substance Use Topics    Alcohol use: Not Currently    Drug use: Never     Review of Systems   Constitutional: Negative.    HENT: Negative.     Eyes: Negative.    Respiratory: Negative.     Cardiovascular: Negative.    Gastrointestinal: Negative.    Genitourinary: Negative.    Musculoskeletal: Negative.    Skin:  Positive for wound (dog bite with sutures).   Neurological:  Positive for numbness.   Hematological: Negative.    Psychiatric/Behavioral: Negative.         Physical Exam     Initial Vitals [12/16/23 1238]   BP Pulse Resp Temp SpO2   129/75 102 18 97.7 °F (36.5 °C) 95 %      MAP       --         Physical Exam    Nursing note and vitals reviewed.  Constitutional: Vital signs are normal. She appears well-developed and well-nourished. She is cooperative. She does not have a sickly appearance. She does not appear ill. No distress.   Cardiovascular:  Normal rate, regular rhythm, normal heart sounds, intact distal pulses and normal pulses.           Pulses:       Radial pulses are 2+ on the right side and 2+ on the left side.   Pulmonary/Chest: Effort normal and  breath sounds normal.     Neurological: She is alert and oriented to person, place, and time. She has normal strength. No sensory deficit.   Skin: Skin is warm and dry. Capillary refill takes less than 2 seconds. Laceration noted.        Psychiatric: She has a normal mood and affect. Her speech is normal and behavior is normal. Thought content normal.         Medical Screening Exam   See Full Note    ED Course   Procedures  Labs Reviewed - No data to display       Imaging Results    None          Medications - No data to display  Medical Decision Making  VS wnl. Right upper arm 0.5 cm laceration from dog bite with one suture intact. No redness or drainage noted. Patient taking Augmentin as directed. No neurovascular compromise noted. NAD noted.    Amount and/or Complexity of Data Reviewed  Discussion of management or test interpretation with external provider(s): Discharge MDM  I discussed physical exam with patient. Right arm dog bite healing well without s/s of infection noted.   Prescription for Naproxen 500 mg given to take for pain and inflammation. Take Naproxen with food as directed. Reviewed discharge instructions with patient. Follow up in 10 days for suture removal.  Patient was discharged in stable condition.  Detailed return precautions discussed. Patient agreed to treatment plan and verbalized understanding.                                      Clinical Impression:   Final diagnoses:  [Z51.89] Visit for wound check (Primary)        ED Disposition Condition    Discharge Stable          ED Prescriptions       Medication Sig Dispense Start Date End Date Auth. Provider    naproxen (NAPROSYN) 500 MG tablet Take 1 tablet (500 mg total) by mouth 2 (two) times daily with meals. 20 tablet 12/16/2023 -- Melanie Briceño FNP          Follow-up Information       Follow up With Specialties Details Why Contact Info    [facility] - Emergency Department Emergency Medicine  For suture removal [id number] [address] [facility]  [address]  [facility] [id number]  [phone]             Melanie Briceño, [facility]  12/16/23 1248

## 2025-02-25 ENCOUNTER — OFFICE VISIT (OUTPATIENT)
Dept: FAMILY MEDICINE | Facility: CLINIC | Age: 45
End: 2025-02-25
Payer: MEDICAID

## 2025-02-25 VITALS
HEIGHT: 62 IN | HEART RATE: 102 BPM | SYSTOLIC BLOOD PRESSURE: 148 MMHG | WEIGHT: 236.81 LBS | OXYGEN SATURATION: 96 % | RESPIRATION RATE: 17 BRPM | BODY MASS INDEX: 43.58 KG/M2 | TEMPERATURE: 99 F | DIASTOLIC BLOOD PRESSURE: 103 MMHG

## 2025-02-25 DIAGNOSIS — E11.65 TYPE 2 DIABETES MELLITUS WITH HYPERGLYCEMIA, WITHOUT LONG-TERM CURRENT USE OF INSULIN: ICD-10-CM

## 2025-02-25 DIAGNOSIS — Z86.39 HX OF DIABETES MELLITUS: ICD-10-CM

## 2025-02-25 DIAGNOSIS — E78.00 HYPERCHOLESTEREMIA: ICD-10-CM

## 2025-02-25 DIAGNOSIS — Z12.31 ENCOUNTER FOR SCREENING MAMMOGRAM FOR MALIGNANT NEOPLASM OF BREAST: ICD-10-CM

## 2025-02-25 DIAGNOSIS — I10 HYPERTENSION, UNSPECIFIED TYPE: Primary | ICD-10-CM

## 2025-02-25 LAB
ALBUMIN SERPL BCP-MCNC: 3.8 G/DL (ref 3.5–5)
ALBUMIN/GLOB SERPL: 1 {RATIO}
ALP SERPL-CCNC: 175 U/L (ref 40–150)
ALT SERPL W P-5'-P-CCNC: 19 U/L
ANION GAP SERPL CALCULATED.3IONS-SCNC: 12 MMOL/L (ref 7–16)
AST SERPL W P-5'-P-CCNC: 25 U/L (ref 5–34)
BASOPHILS # BLD AUTO: 0.11 K/UL (ref 0–0.2)
BASOPHILS NFR BLD AUTO: 1.5 % (ref 0–1)
BILIRUB SERPL-MCNC: 0.3 MG/DL
BUN SERPL-MCNC: 13 MG/DL (ref 7–19)
BUN/CREAT SERPL: 15 (ref 6–20)
CALCIUM SERPL-MCNC: 9.7 MG/DL (ref 8.4–10.2)
CHLORIDE SERPL-SCNC: 103 MMOL/L (ref 98–107)
CHOLEST SERPL-MCNC: 184 MG/DL
CHOLEST/HDLC SERPL: 5.1 {RATIO}
CO2 SERPL-SCNC: 28 MMOL/L (ref 22–29)
CREAT SERPL-MCNC: 0.88 MG/DL (ref 0.55–1.02)
DIFFERENTIAL METHOD BLD: ABNORMAL
EGFR (NO RACE VARIABLE) (RUSH/TITUS): 84 ML/MIN/1.73M2
EOSINOPHIL # BLD AUTO: 0.18 K/UL (ref 0–0.5)
EOSINOPHIL NFR BLD AUTO: 2.5 % (ref 1–4)
ERYTHROCYTE [DISTWIDTH] IN BLOOD BY AUTOMATED COUNT: 12.9 % (ref 11.5–14.5)
EST. AVERAGE GLUCOSE BLD GHB EST-MCNC: 214 MG/DL
GLOBULIN SER-MCNC: 3.9 G/DL (ref 2–4)
GLUCOSE SERPL-MCNC: 243 MG/DL (ref 74–100)
HBA1C MFR BLD HPLC: 9.1 %
HCT VFR BLD AUTO: 45.9 % (ref 38–47)
HDLC SERPL-MCNC: 36 MG/DL (ref 35–60)
HGB BLD-MCNC: 14.3 G/DL (ref 12–16)
IMM GRANULOCYTES # BLD AUTO: 0.05 K/UL (ref 0–0.04)
IMM GRANULOCYTES NFR BLD: 0.7 % (ref 0–0.4)
LDLC SERPL CALC-MCNC: 96 MG/DL
LDLC/HDLC SERPL: 2.7 {RATIO}
LYMPHOCYTES # BLD AUTO: 2.13 K/UL (ref 1–4.8)
LYMPHOCYTES NFR BLD AUTO: 29.6 % (ref 27–41)
MCH RBC QN AUTO: 27.8 PG (ref 27–31)
MCHC RBC AUTO-ENTMCNC: 31.2 G/DL (ref 32–36)
MCV RBC AUTO: 89.1 FL (ref 80–96)
MONOCYTES # BLD AUTO: 0.56 K/UL (ref 0–0.8)
MONOCYTES NFR BLD AUTO: 7.8 % (ref 2–6)
MPC BLD CALC-MCNC: 12.8 FL (ref 9.4–12.4)
NEUTROPHILS # BLD AUTO: 4.16 K/UL (ref 1.8–7.7)
NEUTROPHILS NFR BLD AUTO: 57.9 % (ref 53–65)
NONHDLC SERPL-MCNC: 148 MG/DL
NRBC # BLD AUTO: 0 X10E3/UL
NRBC, AUTO (.00): 0 %
PLATELET # BLD AUTO: 246 K/UL (ref 150–400)
POTASSIUM SERPL-SCNC: 3.6 MMOL/L (ref 3.5–5.1)
PROT SERPL-MCNC: 7.7 G/DL (ref 6.4–8.3)
RBC # BLD AUTO: 5.15 M/UL (ref 4.2–5.4)
SODIUM SERPL-SCNC: 139 MMOL/L (ref 136–145)
TRIGL SERPL-MCNC: 261 MG/DL (ref 37–140)
TSH SERPL DL<=0.005 MIU/L-ACNC: 0.82 UIU/ML (ref 0.35–4.94)
VLDLC SERPL-MCNC: 52 MG/DL
WBC # BLD AUTO: 7.19 K/UL (ref 4.5–11)

## 2025-02-25 PROCEDURE — 85025 COMPLETE CBC W/AUTO DIFF WBC: CPT | Mod: ,,, | Performed by: CLINICAL MEDICAL LABORATORY

## 2025-02-25 PROCEDURE — 84443 ASSAY THYROID STIM HORMONE: CPT | Mod: ,,, | Performed by: CLINICAL MEDICAL LABORATORY

## 2025-02-25 PROCEDURE — 3080F DIAST BP >= 90 MM HG: CPT | Mod: CPTII,,, | Performed by: NURSE PRACTITIONER

## 2025-02-25 PROCEDURE — 1159F MED LIST DOCD IN RCRD: CPT | Mod: CPTII,,, | Performed by: NURSE PRACTITIONER

## 2025-02-25 PROCEDURE — 3008F BODY MASS INDEX DOCD: CPT | Mod: CPTII,,, | Performed by: NURSE PRACTITIONER

## 2025-02-25 PROCEDURE — 3046F HEMOGLOBIN A1C LEVEL >9.0%: CPT | Mod: CPTII,,, | Performed by: NURSE PRACTITIONER

## 2025-02-25 PROCEDURE — 1160F RVW MEDS BY RX/DR IN RCRD: CPT | Mod: CPTII,,, | Performed by: NURSE PRACTITIONER

## 2025-02-25 PROCEDURE — 3077F SYST BP >= 140 MM HG: CPT | Mod: CPTII,,, | Performed by: NURSE PRACTITIONER

## 2025-02-25 PROCEDURE — 80061 LIPID PANEL: CPT | Mod: ,,, | Performed by: CLINICAL MEDICAL LABORATORY

## 2025-02-25 PROCEDURE — 83036 HEMOGLOBIN GLYCOSYLATED A1C: CPT | Mod: ,,, | Performed by: CLINICAL MEDICAL LABORATORY

## 2025-02-25 PROCEDURE — 80053 COMPREHEN METABOLIC PANEL: CPT | Mod: ,,, | Performed by: CLINICAL MEDICAL LABORATORY

## 2025-02-25 PROCEDURE — 99214 OFFICE O/P EST MOD 30 MIN: CPT | Mod: ,,, | Performed by: NURSE PRACTITIONER

## 2025-02-25 RX ORDER — SEMAGLUTIDE 0.68 MG/ML
0.25 INJECTION, SOLUTION SUBCUTANEOUS
Qty: 4 EACH | Refills: 0 | Status: SHIPPED | OUTPATIENT
Start: 2025-02-25

## 2025-02-25 RX ORDER — HYDROCHLOROTHIAZIDE 25 MG/1
25 TABLET ORAL DAILY
Qty: 30 TABLET | Refills: 2 | Status: SHIPPED | OUTPATIENT
Start: 2025-02-25

## 2025-02-26 ENCOUNTER — RESULTS FOLLOW-UP (OUTPATIENT)
Dept: FAMILY MEDICINE | Facility: CLINIC | Age: 45
End: 2025-02-26

## 2025-02-26 DIAGNOSIS — E78.2 MIXED HYPERLIPIDEMIA: Primary | ICD-10-CM

## 2025-02-26 RX ORDER — ROSUVASTATIN CALCIUM 5 MG/1
5 TABLET, COATED ORAL NIGHTLY
Qty: 30 TABLET | Refills: 2 | Status: SHIPPED | OUTPATIENT
Start: 2025-02-26 | End: 2026-02-26

## 2025-02-26 NOTE — PROGRESS NOTES
Please let pt know her A1C 9.1, but she has been out of her medication and starting the ozempic will help with this. Her cholesterol is also elevated and her ASCVD risk score is 15% so she also needs to be on cholesterol med as well. She needs to take this at night. The main side effect of this is muscle or joint aches so if she notices this she can take every other night. If she can't tolerate, we will figure something else out. She also needs to be on low carb, low cholesterol diet. Thanks!

## 2025-03-04 PROBLEM — Z12.31 ENCOUNTER FOR SCREENING MAMMOGRAM FOR MALIGNANT NEOPLASM OF BREAST: Status: ACTIVE | Noted: 2025-03-04

## 2025-03-04 PROBLEM — Z86.39 HX OF DIABETES MELLITUS: Status: ACTIVE | Noted: 2025-03-04

## 2025-03-04 PROBLEM — E11.65 TYPE 2 DIABETES MELLITUS WITH HYPERGLYCEMIA, WITHOUT LONG-TERM CURRENT USE OF INSULIN: Status: ACTIVE | Noted: 2025-03-04

## 2025-03-04 PROBLEM — E78.00 HYPERCHOLESTEREMIA: Status: ACTIVE | Noted: 2025-03-04

## 2025-03-04 NOTE — PROGRESS NOTES
"   ISABELLA Coronado   [facility]  [address]  [place] [address]     PATIENT NAME: Celsa Ramos  : 1980  DATE: 25  MRN: [id number]      Billing Provider: ISABELLA Coronado  Level of Service:   Patient PCP Information       Provider PCP Type    Primary Doctor No General            Reason for Visit / Chief Complaint: Establish Care (Ms.Denise YOSHI Ramos is a 44 y.o. female who presents to the clinic today  wanting to establish care. Pt states she is diabetic, has hypertension, high cholesterol and neuropathy in her feet. She has not taken any medicine in "a minute".Says she checked her bp a few days ago and it was 168/140. BS was >400, random.)   Health Maintenance Due   Topic Date Due    Hepatitis C Screening  Never done    Foot Exam  Never done    Diabetic Eye Exam  Never done    HIV Screening  Never done    TETANUS VACCINE  Never done    Mammogram  Never done    Pneumococcal Vaccines (Age 0-49) (1 of 2 - PCV) Never done    Diabetes Urine Screening  2024    COVID-19 Vaccine (2024- season) Never done          Update PCP  Update Chief Complaint         History of Present Illness / Problem Focused Workflow     History of Present Illness    CHIEF COMPLAINT:  Patient presents today for follow up after not being seen for a while.    HYPERTENSION:  She is not currently taking blood pressure medication. She recently checked her blood pressure at her grandmother's house when not feeling well and it was elevated. She experiences headaches with elevated blood pressure.    DIABETES:  Her previous A1C was 6.5. She previously took metformin for years with good tolerance. Recent blood sugar was elevated when checked on grandmother's glucometer. She currently does not have a glucose meter. She does not have vision insurance eye exam.     APPETITE:  She reports decreased appetite and has to force herself to eat, stating nothing sounds appetizing.    SURGICAL HISTORY:  She underwent a " complete hysterectomy at age 25. Prior to this, she had two partial surgeries where tubes and ovaries were initially left intact.    FAMILY HISTORY:  Family history is significant for ovarian cancer.    SOCIAL HISTORY:  She lives with her father.          Hemoglobin A1C   Date Value Ref Range Status   02/25/2025 9.1 (H) <=7.0 % Final     Comment:       Normal:               <5.7%  Pre-Diabetic:       5.7% to 6.4%  Diabetic:             >6.4%  Diabetic Goal:     <7%   06/29/2023 6.5 4.5 - 6.6 % Final     Comment:       Normal:               <5.7%  Pre-Diabetic:       5.7% to 6.4%  Diabetic:             >6.4%  Diabetic Goal:     <7%        CMP  Sodium   Date Value Ref Range Status   02/25/2025 139 136 - 145 mmol/L Final     Potassium   Date Value Ref Range Status   02/25/2025 3.6 3.5 - 5.1 mmol/L Final     Chloride   Date Value Ref Range Status   02/25/2025 103 98 - 107 mmol/L Final     CO2   Date Value Ref Range Status   02/25/2025 28 22 - 29 mmol/L Final     Glucose   Date Value Ref Range Status   02/25/2025 243 (H) 74 - 100 mg/dL Final     BUN   Date Value Ref Range Status   02/25/2025 13 7 - 19 mg/dL Final     Creatinine   Date Value Ref Range Status   02/25/2025 0.88 0.55 - 1.02 mg/dL Final     Calcium   Date Value Ref Range Status   02/25/2025 9.7 8.4 - 10.2 mg/dL Final     Total Protein   Date Value Ref Range Status   02/25/2025 7.7 6.4 - 8.3 g/dL Final     Albumin   Date Value Ref Range Status   02/25/2025 3.8 3.5 - 5.0 g/dL Final     Bilirubin, Total   Date Value Ref Range Status   02/25/2025 0.3 <=1.5 mg/dL Final     Alk Phos   Date Value Ref Range Status   02/25/2025 175 (H) 40 - 150 U/L Final     AST   Date Value Ref Range Status   02/25/2025 25 5 - 34 U/L Final     ALT   Date Value Ref Range Status   02/25/2025 19 <=55 U/L Final     Anion Gap   Date Value Ref Range Status   02/25/2025 12 7 - 16 mmol/L Final     eGFR   Date Value Ref Range Status   02/25/2025 84 >=60 mL/min/1.73m2 Final     Comment:      Estimated GFR calculated using the CKD-EPI creatinine (2021) equation.        Lab Results   Component Value Date    WBC 7.19 02/25/2025    RBC 5.15 02/25/2025    HGB 14.3 02/25/2025    HCT 45.9 02/25/2025    MCV 89.1 02/25/2025    MCH 27.8 02/25/2025    MCHC 31.2 (L) 02/25/2025    RDW 12.9 02/25/2025     02/25/2025    MPV 12.8 (H) 02/25/2025    LYMPH 29.6 02/25/2025    LYMPH 2.13 02/25/2025    MONO 7.8 (H) 02/25/2025    EOS 0.18 02/25/2025    BASO 0.11 02/25/2025    EOSINOPHIL 2.5 02/25/2025    BASOPHIL 1.5 (H) 02/25/2025        Lab Results   Component Value Date    CHOL 184 02/25/2025    CHOL 195 06/29/2023     Lab Results   Component Value Date    HDL 36 02/25/2025    HDL 44 06/29/2023     Lab Results   Component Value Date    LDLCALC 96 02/25/2025    LDLCALC 99 06/29/2023     Lab Results   Component Value Date    TRIG 261 (H) 02/25/2025    TRIG 259 (H) 06/29/2023     Lab Results   Component Value Date    CHOLHDL 5.1 02/25/2025    CHOLHDL 4.4 06/29/2023        Wt Readings from Last 3 Encounters:   02/25/25 1427 107.4 kg (236 lb 12.8 oz)   12/16/23 1238 113.4 kg (250 lb)   12/14/23 1053 113.4 kg (250 lb)        BP Readings from Last 3 Encounters:   02/25/25 (!) 148/103   12/16/23 129/75   12/14/23 (!) 146/86        Review of Systems     Review of Systems       Medical / Social / Family History     Past Medical History:   Diagnosis Date    Diabetes mellitus, type 2     GERD (gastroesophageal reflux disease)     Hyperlipidemia     Hypertension        Past Surgical History:   Procedure Laterality Date    CHOLECYSTECTOMY      FRACTURE SURGERY      HYSTERECTOMY         Social History  Ms.  reports that she has been smoking cigarettes. She started smoking about 4 years ago. She has a 2.1 pack-year smoking history. She has been exposed to tobacco smoke. She has never used smokeless tobacco. She reports that she does not currently use alcohol. She reports current drug use. Drug: Marijuana.    Family History  Ms.'s  "family history includes Diabetes in her father; Diabetes Mellitus in her paternal grandmother; Hypertension in her father and paternal grandmother.    Medications and Allergies     Medications  No outpatient medications have been marked as taking for the 2/25/25 encounter (Office Visit) with Desiree Parra FNP.       Allergies  Review of patient's allergies indicates:   Allergen Reactions    Sulfa (sulfonamide antibiotics)        Physical Examination     Vitals:    02/25/25 1427   BP: (!) 148/103   BP Location: Left arm   Patient Position: Sitting   Pulse: 102   Resp: 17   Temp: 98.7 °F (37.1 °C)   TempSrc: Oral   SpO2: 96%   Weight: 107.4 kg (236 lb 12.8 oz)   Height: 5' 2" (1.575 m)      Physical Exam  Constitutional:       Appearance: Normal appearance. She is obese.   HENT:      Head: Normocephalic.   Eyes:      Extraocular Movements: Extraocular movements intact.   Cardiovascular:      Rate and Rhythm: Normal rate and regular rhythm.      Pulses: Normal pulses.      Heart sounds: Normal heart sounds.   Pulmonary:      Effort: Pulmonary effort is normal.      Breath sounds: Normal breath sounds.   Skin:     General: Skin is warm and dry.      Capillary Refill: Capillary refill takes less than 2 seconds.   Neurological:      General: No focal deficit present.      Mental Status: She is alert and oriented to person, place, and time.   Psychiatric:         Mood and Affect: Mood normal.         Behavior: Behavior normal.          Assessment and Plan (including Health Maintenance)      Problem List  Smart Sets  Document Outside HM   :    Plan:     There are no Patient Instructions on file for this visit.       Health Maintenance Due   Topic Date Due    Hepatitis C Screening  Never done    Foot Exam  Never done    Diabetic Eye Exam  Never done    HIV Screening  Never done    TETANUS VACCINE  Never done    Mammogram  Never done    Pneumococcal Vaccines (Age 0-49) (1 of 2 - PCV) Never done    Diabetes Urine Screening  " 06/29/2024    COVID-19 Vaccine (1 - 2024-25 season) Never done       Problem List Items Addressed This Visit       Encounter for screening mammogram for malignant neoplasm of breast    Relevant Orders    Mammo Digital Screening Bilat w/ James (XPD)    Hx of diabetes mellitus    Relevant Orders    Comprehensive Metabolic Panel (Completed)    Hemoglobin A1C (Completed)    Hypercholesteremia    Relevant Orders    CBC Auto Differential (Completed)    Lipid Panel (Completed)    Hypertension - Primary    Relevant Medications    hydroCHLOROthiazide (HYDRODIURIL) 25 MG tablet    Other Relevant Orders    CBC Auto Differential (Completed)    Lipid Panel (Completed)    TSH (Completed)    Type 2 diabetes mellitus with hyperglycemia, without long-term current use of insulin    Relevant Medications    semaglutide (OZEMPIC) 0.25 mg or 0.5 mg (2 mg/3 mL) pen injector    Other Relevant Orders    Microalbumin/Creatinine Ratio, Urine     Assessment & Plan    IMPRESSION:  - Restarted HCTZ for hypertension management, as patient previously tolerated it well  - Considering Ozempic (GLP-1 agonist) for diabetes management due to potential benefits of once-weekly dosing and weight loss effects  - Ordered comprehensive lab work to assess current health status, including A1C, cholesterol, and thyroid function  - Recommend mammogram screening due to extended time since last exam    HYPERTENSION:  - Instructed the patient to check blood pressure using grandmother's blood pressure cuff if possible.  - Advised the patient to come to the office for blood pressure checks without needing an appointment if unable to check at home.  - Restarted hydrochlorothiazide (HCTZ) for blood pressure management.  - Sent prescription for HCTZ to [facility].  - Scheduled follow up in 3 months to reassess blood pressure.  - Requested the patient to contact the office next week to report blood pressure readings after starting HCTZ.  - Noted that the patient  checked blood pressure at grandmother's house and it was elevated.  - Acknowledged the patient's history of hypertension.  - Planned to monitor blood pressure after initiating medication.  - Noted that headache when blood pressure is elevated.    DIABETES:  - Explained potential side effects of Ozempic, primarily GI-related issues such as nausea.  - Discussed the mechanism of action for GLP-1 agonists.  - Initiated Ozempic 0.25 mg weekly injection for diabetes management.  - Instructed the patient to rotate injection sites between thigh, abdomen, and back of the arm.  - Advised to continue 0.25 mg dose for 1 month.  - Requested the patient to contact office before running out of 0.25 mg dose to potentially increase to 0.5 mg dose if tolerating well.  - Scheduled follow up in 3 months to reassess A1C.  - Instructed the patient to contact the office if experiencing intolerable side effects from Ozempic.  - Noted that the patient checked glucose at grandmother's house and it was elevated.  - Recorded previous A1C was 6.5.  - Planned to check A1C level at follow-up.  - Provided the patient with a glucometer.  - Discussed the mechanism of action for GLP-1 agonists, including appetite suppression and potential weight loss effects.  - Noted that the patient reports lack of appetite and having to force herself to eat.        Hypertension, unspecified type  -     CBC Auto Differential; Future; Expected date: 02/25/2025  -     Lipid Panel; Future; Expected date: 02/25/2025  -     TSH; Future; Expected date: 02/25/2025  -     hydroCHLOROthiazide (HYDRODIURIL) 25 MG tablet; Take 1 tablet (25 mg total) by mouth once daily.  Dispense: 30 tablet; Refill: 2    Hx of diabetes mellitus  -     Comprehensive Metabolic Panel; Future; Expected date: 02/25/2025  -     Hemoglobin A1C; Future; Expected date: 02/25/2025  -     Cancel: Microalbumin/Creatinine Ratio, Urine    Hypercholesteremia  -     CBC Auto Differential; Future; Expected  date: 02/25/2025  -     Lipid Panel; Future; Expected date: 02/25/2025    Encounter for screening mammogram for malignant neoplasm of breast  -     Cancel: Mammo Digital Screening Bilat; Future; Expected date: 02/25/2025  -     Mammo Digital Screening Bilat w/ James (XPD); Future; Expected date: 02/25/2025    Type 2 diabetes mellitus with hyperglycemia, without long-term current use of insulin  -     semaglutide (OZEMPIC) 0.25 mg or 0.5 mg (2 mg/3 mL) pen injector; Inject 0.25 mg into the skin every 7 days.  Dispense: 4 each; Refill: 0  -     Microalbumin/Creatinine Ratio, Urine       Health Maintenance Topics with due status: Not Due       Topic Last Completion Date    Lipid Panel 02/25/2025    Hemoglobin A1c 02/25/2025    Low Dose Statin 02/26/2025    RSV Vaccine (Age 60+ and Pregnant patients) Not Due         Future Appointments   Date Time Provider Department Center   3/31/2025  9:30 AM [facility] [id number] MAMMO [facility]   5/22/2025  1:20 PM Des[facility]e Parra [facility]        Follow up in about 3 months (around 5/25/2025), or if symptoms worsen or fail to improve.    Health Maintenance Due   Topic Date Due    Hepatitis C Screening  Never done    Foot Exam  Never done    Diabetic Eye Exam  Never done    HIV Screening  Never done    TETANUS VACCINE  Never done    Mammogram  Never done    Pneumococcal Vaccines (Age 0-49) (1 of 2 - PCV) Never done    Diabetes Urine Screening  06/29/2024    COVID-19 Vaccine (1 - 2024-25 season) Never done        Signature:  ISABELLA Coronado    Date of encounter: 2/25/25  This note was generated with the assistance of ambient listening technology. Verbal consent was obtained by the patient and accompanying visitor(s) for the recording of patient appointment to facilitate this note. I attest to having reviewed and edited the generated note for accuracy, though some syntax or spelling errors may persist. Please contact the author of this note for any  clarification.

## 2025-03-12 DIAGNOSIS — E11.65 TYPE 2 DIABETES MELLITUS WITH HYPERGLYCEMIA, WITHOUT LONG-TERM CURRENT USE OF INSULIN: Primary | ICD-10-CM

## 2025-03-12 RX ORDER — METFORMIN HYDROCHLORIDE 500 MG/1
500 TABLET, EXTENDED RELEASE ORAL 2 TIMES DAILY WITH MEALS
Qty: 180 TABLET | Refills: 0 | Status: SHIPPED | OUTPATIENT
Start: 2025-03-12 | End: 2026-03-12

## 2025-03-31 ENCOUNTER — OFFICE VISIT (OUTPATIENT)
Dept: FAMILY MEDICINE | Facility: CLINIC | Age: 45
End: 2025-03-31
Payer: MEDICAID

## 2025-03-31 VITALS
TEMPERATURE: 99 F | OXYGEN SATURATION: 96 % | DIASTOLIC BLOOD PRESSURE: 88 MMHG | RESPIRATION RATE: 19 BRPM | HEIGHT: 62 IN | HEART RATE: 111 BPM | SYSTOLIC BLOOD PRESSURE: 136 MMHG | BODY MASS INDEX: 42.99 KG/M2 | WEIGHT: 233.63 LBS

## 2025-03-31 DIAGNOSIS — J02.9 SORETHROAT: ICD-10-CM

## 2025-03-31 DIAGNOSIS — J01.00 ACUTE NON-RECURRENT MAXILLARY SINUSITIS: Primary | ICD-10-CM

## 2025-03-31 DIAGNOSIS — R05.9 COUGH, UNSPECIFIED TYPE: ICD-10-CM

## 2025-03-31 LAB
CTP QC/QA: YES
MOLECULAR STREP A: NEGATIVE
POC MOLECULAR INFLUENZA A AGN: NEGATIVE
POC MOLECULAR INFLUENZA B AGN: NEGATIVE
SARS-COV-2 RDRP RESP QL NAA+PROBE: NEGATIVE

## 2025-03-31 PROCEDURE — 3046F HEMOGLOBIN A1C LEVEL >9.0%: CPT | Mod: CPTII,,, | Performed by: NURSE PRACTITIONER

## 2025-03-31 PROCEDURE — 87651 STREP A DNA AMP PROBE: CPT | Mod: RHCUB | Performed by: NURSE PRACTITIONER

## 2025-03-31 PROCEDURE — 99214 OFFICE O/P EST MOD 30 MIN: CPT | Mod: ,,, | Performed by: NURSE PRACTITIONER

## 2025-03-31 PROCEDURE — 3079F DIAST BP 80-89 MM HG: CPT | Mod: CPTII,,, | Performed by: NURSE PRACTITIONER

## 2025-03-31 PROCEDURE — 87635 SARS-COV-2 COVID-19 AMP PRB: CPT | Mod: RHCUB | Performed by: NURSE PRACTITIONER

## 2025-03-31 PROCEDURE — 87502 INFLUENZA DNA AMP PROBE: CPT | Mod: RHCUB | Performed by: NURSE PRACTITIONER

## 2025-03-31 PROCEDURE — 1159F MED LIST DOCD IN RCRD: CPT | Mod: CPTII,,, | Performed by: NURSE PRACTITIONER

## 2025-03-31 PROCEDURE — 1160F RVW MEDS BY RX/DR IN RCRD: CPT | Mod: CPTII,,, | Performed by: NURSE PRACTITIONER

## 2025-03-31 PROCEDURE — 3075F SYST BP GE 130 - 139MM HG: CPT | Mod: CPTII,,, | Performed by: NURSE PRACTITIONER

## 2025-03-31 PROCEDURE — 3008F BODY MASS INDEX DOCD: CPT | Mod: CPTII,,, | Performed by: NURSE PRACTITIONER

## 2025-03-31 RX ORDER — AZITHROMYCIN 250 MG/1
TABLET, FILM COATED ORAL
Qty: 6 TABLET | Refills: 0 | Status: SHIPPED | OUTPATIENT
Start: 2025-03-31 | End: 2025-04-05

## 2025-03-31 NOTE — PROGRESS NOTES
ISABELLA Coronado   [facility]  [address]  [place] [address]     PATIENT NAME: Celsa Ramos  : 1980  DATE: 3/31/25  MRN: [id number]      Billing Provider: ISABELLA Coronado  Level of Service:   Patient PCP Information       Provider PCP Type    ISABELLA Coronado General            Reason for Visit / Chief Complaint: Cough (Pt is here w c/o a cough,chills and sorethroat for about 3 days. Fever not noted.)   Health Maintenance Due   Topic Date Due    Hepatitis C Screening  Never done    Diabetic Eye Exam  Never done    HIV Screening  Never done    TETANUS VACCINE  Never done    Mammogram  Never done    Pneumococcal Vaccines (Age 0-49) (1 of 2 - PCV) Never done    Diabetes Urine Screening  2024    COVID-19 Vaccine ( - - season) Never done          Update PCP  Update Chief Complaint         History of Present Illness / Problem Focused Workflow     History of Present Illness    CHIEF COMPLAINT:  Patient presents today with sore throat and cough    UPPER RESPIRATORY SYMPTOMS:  She reports severe sore throat affecting her ability to swallow and throat irritation. She has a productive cough with green sputum and thick nasal drainage that is difficult to expel without forceful blowing.    ALLERGIES:  She has a known allergy to Bactrim/Sulfa.    MEDICATIONS:  She has tried various OTC medications for symptom relief but notes concerns about their sedating effects due to her work as a .    DIABETES:  She is currently out of diabetic testing supplies. Despite approval from diabetic supply shop, she has not received supplies or follow-up communication.      ROS:  General: -fever, -chills, -fatigue, -weight gain, -weight loss  Eyes: -vision changes, -redness, -discharge  ENT: -ear pain, +nasal congestion, +sore throat, +difficulty swallowing, +nasal discharge, +runny nose  Cardiovascular: -chest pain, -palpitations, -lower extremity edema  Respiratory: -cough, -shortness of  breath, +productive cough, +wheezing  Gastrointestinal: -abdominal pain, -nausea, -vomiting, -diarrhea, -constipation, -blood in stool  Genitourinary: -dysuria, -hematuria, -frequency  Musculoskeletal: -joint pain, -muscle pain  Skin: -rash, -lesion  Neurological: -headache, -dizziness, -numbness, -tingling  Psychiatric: -anxiety, -depression, -sleep difficulty          Hemoglobin A1C   Date Value Ref Range Status   02/25/2025 9.1 (H) <=7.0 % Final     Comment:       Normal:               <5.7%  Pre-Diabetic:       5.7% to 6.4%  Diabetic:             >6.4%  Diabetic Goal:     <7%   06/29/2023 6.5 4.5 - 6.6 % Final     Comment:       Normal:               <5.7%  Pre-Diabetic:       5.7% to 6.4%  Diabetic:             >6.4%  Diabetic Goal:     <7%        CMP  Sodium   Date Value Ref Range Status   02/25/2025 139 136 - 145 mmol/L Final     Potassium   Date Value Ref Range Status   02/25/2025 3.6 3.5 - 5.1 mmol/L Final     Chloride   Date Value Ref Range Status   02/25/2025 103 98 - 107 mmol/L Final     CO2   Date Value Ref Range Status   02/25/2025 28 22 - 29 mmol/L Final     Glucose   Date Value Ref Range Status   02/25/2025 243 (H) 74 - 100 mg/dL Final     BUN   Date Value Ref Range Status   02/25/2025 13 7 - 19 mg/dL Final     Creatinine   Date Value Ref Range Status   02/25/2025 0.88 0.55 - 1.02 mg/dL Final     Calcium   Date Value Ref Range Status   02/25/2025 9.7 8.4 - 10.2 mg/dL Final     Total Protein   Date Value Ref Range Status   02/25/2025 7.7 6.4 - 8.3 g/dL Final     Albumin   Date Value Ref Range Status   02/25/2025 3.8 3.5 - 5.0 g/dL Final     Bilirubin, Total   Date Value Ref Range Status   02/25/2025 0.3 <=1.5 mg/dL Final     Alk Phos   Date Value Ref Range Status   02/25/2025 175 (H) 40 - 150 U/L Final     AST   Date Value Ref Range Status   02/25/2025 25 5 - 34 U/L Final     ALT   Date Value Ref Range Status   02/25/2025 19 <=55 U/L Final     Anion Gap   Date Value Ref Range Status   02/25/2025 12 7  - 16 mmol/L Final     eGFR   Date Value Ref Range Status   02/25/2025 84 >=60 mL/min/1.73m2 Final     Comment:     Estimated GFR calculated using the CKD-EPI creatinine (2021) equation.        Lab Results   Component Value Date    WBC 7.19 02/25/2025    RBC 5.15 02/25/2025    HGB 14.3 02/25/2025    HCT 45.9 02/25/2025    MCV 89.1 02/25/2025    MCH 27.8 02/25/2025    MCHC 31.2 (L) 02/25/2025    RDW 12.9 02/25/2025     02/25/2025    MPV 12.8 (H) 02/25/2025    LYMPH 29.6 02/25/2025    LYMPH 2.13 02/25/2025    MONO 7.8 (H) 02/25/2025    EOS 0.18 02/25/2025    BASO 0.11 02/25/2025    EOSINOPHIL 2.5 02/25/2025    BASOPHIL 1.5 (H) 02/25/2025        Lab Results   Component Value Date    CHOL 184 02/25/2025    CHOL 195 06/29/2023     Lab Results   Component Value Date    HDL 36 02/25/2025    HDL 44 06/29/2023     Lab Results   Component Value Date    LDLCALC 96 02/25/2025    LDLCALC 99 06/29/2023     Lab Results   Component Value Date    TRIG 261 (H) 02/25/2025    TRIG 259 (H) 06/29/2023     Lab Results   Component Value Date    CHOLHDL 5.1 02/25/2025    CHOLHDL 4.4 06/29/2023        Wt Readings from Last 3 Encounters:   03/31/25 1401 106 kg (233 lb 9.6 oz)   02/25/25 1427 107.4 kg (236 lb 12.8 oz)   12/16/23 1238 113.4 kg (250 lb)        BP Readings from Last 3 Encounters:   03/31/25 136/88   02/25/25 (!) 148/103   12/16/23 129/75        Review of Systems     Review of Systems       Medical / Social / Family History     Past Medical History:   Diagnosis Date    Diabetes mellitus, type 2     GERD (gastroesophageal reflux disease)     Hyperlipidemia     Hypertension        Past Surgical History:   Procedure Laterality Date    CHOLECYSTECTOMY      FRACTURE SURGERY      HYSTERECTOMY         Social History  Ms.  reports that she has been smoking cigarettes. She started smoking about 4 years ago. She has a 2.1 pack-year smoking history. She has been exposed to tobacco smoke. She has never used smokeless tobacco. She  "reports that she does not currently use alcohol. She reports current drug use. Drug: Marijuana.    Family History  Ms.'s family history includes Diabetes in her father; Diabetes Mellitus in her paternal grandmother; Hypertension in her father and paternal grandmother.    Medications and Allergies     Medications  Outpatient Medications Marked as Taking for the 3/31/25 encounter (Office Visit) with Desiree Parra FNP   Medication Sig Dispense Refill    hydroCHLOROthiazide (HYDRODIURIL) 25 MG tablet Take 1 tablet (25 mg total) by mouth once daily. 30 tablet 2    metFORMIN (GLUCOPHAGE-XR) 500 MG ER 24hr tablet Take 1 tablet (500 mg total) by mouth 2 (two) times daily with meals. 180 tablet 0    rosuvastatin (CRESTOR) 5 MG tablet Take 1 tablet (5 mg total) by mouth every evening. 30 tablet 2    semaglutide (OZEMPIC) 0.25 mg or 0.5 mg (2 mg/3 mL) pen injector Inject 0.25 mg into the skin every 7 days. 4 each 0       Allergies  Review of patient's allergies indicates:   Allergen Reactions    Sulfa (sulfonamide antibiotics)        Physical Examination     Vitals:    03/31/25 1401   BP: 136/88   BP Location: Right arm   Patient Position: Sitting   Pulse: (!) 111   Resp: 19   Temp: 98.8 °F (37.1 °C)   TempSrc: Oral   SpO2: 96%   Weight: 106 kg (233 lb 9.6 oz)   Height: 5' 2" (1.575 m)      Physical Exam  Constitutional:       Appearance: Normal appearance. She is obese.   HENT:      Head: Normocephalic.      Right Ear: Hearing, tympanic membrane, ear canal and external ear normal.      Left Ear: Hearing, tympanic membrane, ear canal and external ear normal.      Nose: Nose normal.      Mouth/Throat:      Mouth: Mucous membranes are moist.      Pharynx: Posterior oropharyngeal erythema and postnasal drip present.   Eyes:      Extraocular Movements: Extraocular movements intact.   Cardiovascular:      Rate and Rhythm: Normal rate and regular rhythm.      Pulses: Normal pulses.      Heart sounds: Normal heart sounds. "   Pulmonary:      Effort: Pulmonary effort is normal.      Breath sounds: Normal breath sounds.   Skin:     General: Skin is warm and dry.      Capillary Refill: Capillary refill takes less than 2 seconds.   Neurological:      General: No focal deficit present.      Mental Status: She is alert and oriented to person, place, and time.   Psychiatric:         Mood and Affect: Mood normal.         Behavior: Behavior normal.          Assessment and Plan (including Health Maintenance)      Problem List  Smart Sets  Document Outside HM   :    Plan:     There are no Patient Instructions on file for this visit.       Health Maintenance Due   Topic Date Due    Hepatitis C Screening  Never done    Diabetic Eye Exam  Never done    HIV Screening  Never done    TETANUS VACCINE  Never done    Mammogram  Never done    Pneumococcal Vaccines (Age 0-49) (1 of 2 - PCV) Never done    Diabetes Urine Screening  06/29/2024    COVID-19 Vaccine (1 - 2024-25 season) Never done       Problem List Items Addressed This Visit       Acute non-recurrent maxillary sinusitis - Primary    Relevant Medications    azithromycin (Z-ELIZABETH) 250 MG tablet    Cough    Relevant Orders    POCT Influenza A/B Molecular (Completed)    POCT COVID-19 Rapid Screening (Completed)    Sorethroat    Relevant Orders    POCT Strep A, Molecular (Completed)     Assessment & Plan    J01.90 Acute sinusitis, unspecified  R05.3 Chronic cough  R07.0 Pain in throat  E11.9 Type 2 diabetes mellitus without complications  Z88.2 Allergy status to sulfonamides    IMPRESSION:  - Assessed symptoms: sore throat, coughing up green mucus, and sinus congestion.  - Performed exam, noted throat irritation.  - Considered allergy to Bactrim/Sulfa when selecting antibiotic treatment.  - Started Z-Elizabeth (azithromycin) for treatment of likely sinus infection.    ACUTE SINUSITIS:  - Prescribed Z-Elizabeth (azithromycin) for treatment of likely sinus infection.  - Recommend OTC medications for symptom relief:  Antihistamines (e.g., Zyrtec, Claritin) for nasal drainage - Nasal sprays (e.g., Flonase) for congestion  - Noted that the patient is coughing up green mucus and experiencing drainage issues.  - Examined the patient's ears and throat, observing irritation in the throat.  - Assessed the condition as sinus-related.  - Noted that the patient reports coughing and producing green mucus.  - Auscultated the patient's chest, finding no abnormal sounds.    CHRONIC COUGH:  - Recommend OTC Mucinex DM for cough and mucus relief.    THROAT PAIN:  - Noted that the patient reports severe throat pain affecting swallowing.  - Examined the throat and observed irritation.  - Prescribed Z-Elizabeth, which may also help alleviate throat pain.    TYPE 2 DIABETES:  - Will contact the diabetic supply company regarding the previously approved diabetic meter strips order.  - Noted that the patient is out of diabetes testing supplies.  - Acknowledged the need to follow up on diabetes supplies.    SULFA ALLERGY:  - Acknowledged the patient's allergy to Bactrim or sulfa drugs.  - Chose an alternative antibiotic (Z-Elizabeth) due to the sulfa allergy.        Acute non-recurrent maxillary sinusitis  -     azithromycin (Z-ELIZABETH) 250 MG tablet; Take 2 tablets by mouth on day 1; Take 1 tablet by mouth on days 2-5  Dispense: 6 tablet; Refill: 0    Sorethroat  -     POCT Strep A, Molecular    Cough, unspecified type  -     POCT Influenza A/B Molecular  -     POCT COVID-19 Rapid Screening       Health Maintenance Topics with due status: Not Due       Topic Last Completion Date    Lipid Panel 02/25/2025    Hemoglobin A1c 02/25/2025    High Dose Statin 03/31/2025    RSV Vaccine (Age 60+ and Pregnant patients) Not Due         Future Appointments   Date Time Provider Department Center   5/22/2025  1:20 PM Desiree Parra FNP [facility]        No follow-ups on file.    Health Maintenance Due   Topic Date Due    Hepatitis C Screening  Never done    Diabetic  Eye Exam  Never done    HIV Screening  Never done    TETANUS VACCINE  Never done    Mammogram  Never done    Pneumococcal Vaccines (Age 0-49) (1 of 2 - PCV) Never done    Diabetes Urine Screening  06/29/2024    COVID-19 Vaccine (1 - 2024-25 season) Never done        Signature:  ISABELLA Coronado    Date of encounter: 3/31/25  This note was generated with the assistance of ambient listening technology. Verbal consent was obtained by the patient and accompanying visitor(s) for the recording of patient appointment to facilitate this note. I attest to having reviewed and edited the generated note for accuracy, though some syntax or spelling errors may persist. Please contact the author of this note for any clarification.

## 2025-07-23 ENCOUNTER — HOSPITAL ENCOUNTER (EMERGENCY)
Facility: HOSPITAL | Age: 45
Discharge: HOME | End: 2025-07-23
Payer: MEDICAID

## 2025-07-23 VITALS
BODY MASS INDEX: 43.02 KG/M2 | RESPIRATION RATE: 16 BRPM | TEMPERATURE: 98 F | HEIGHT: 62 IN | SYSTOLIC BLOOD PRESSURE: 140 MMHG | HEART RATE: 100 BPM | DIASTOLIC BLOOD PRESSURE: 98 MMHG | WEIGHT: 233.81 LBS | OXYGEN SATURATION: 98 %

## 2025-07-23 DIAGNOSIS — B88.0 CHIGGER BITES: Primary | ICD-10-CM

## 2025-07-23 PROCEDURE — 63600175 PHARM REV CODE 636 W HCPCS: Mod: UD

## 2025-07-23 PROCEDURE — 99284 EMERGENCY DEPT VISIT MOD MDM: CPT | Mod: ,,,

## 2025-07-23 PROCEDURE — 96372 THER/PROPH/DIAG INJ SC/IM: CPT

## 2025-07-23 PROCEDURE — 99284 EMERGENCY DEPT VISIT MOD MDM: CPT | Mod: 25

## 2025-07-23 RX ORDER — HYDROCORTISONE 25 MG/G
CREAM TOPICAL 2 TIMES DAILY
Qty: 20 G | Refills: 0 | Status: SHIPPED | OUTPATIENT
Start: 2025-07-23

## 2025-07-23 RX ORDER — DIPHENHYDRAMINE HYDROCHLORIDE 50 MG/ML
50 INJECTION, SOLUTION INTRAMUSCULAR; INTRAVENOUS
Status: COMPLETED | OUTPATIENT
Start: 2025-07-23 | End: 2025-07-23

## 2025-07-23 RX ORDER — AMOXICILLIN AND CLAVULANATE POTASSIUM 875; 125 MG/1; MG/1
1 TABLET, FILM COATED ORAL 2 TIMES DAILY
Qty: 14 TABLET | Refills: 0 | Status: SHIPPED | OUTPATIENT
Start: 2025-07-23

## 2025-07-23 RX ADMIN — DIPHENHYDRAMINE HYDROCHLORIDE 50 MG: 50 INJECTION, SOLUTION INTRAMUSCULAR; INTRAVENOUS at 09:07

## 2025-07-24 NOTE — ED TRIAGE NOTES
Presents with c/o itching to backs of bilateral legs and breast. States she may have gotten into poison Ivy. Has tried benadryl and otc calamine lotion.

## 2025-07-24 NOTE — ED PROVIDER NOTES
Encounter Date: 7/23/2025       History     Chief Complaint   Patient presents with    Itching     DC is a 43 y/o WF with a PMHx of T2DM, GERD, HLD, and HTN who presents to the ED POV with c/o itching and rash to bilateral lower extremities.  Patient is said her current symptoms started 3 days ago while helping a friend clean out her mother's yd.  Patient stated that she was standing in knee high to waist high grass.  Patient stated that she noticed the rash and started itching after returning home that evening.  Patient has been taking Benadryl at home.        Review of patient's allergies indicates:   Allergen Reactions    Sulfa (sulfonamide antibiotics)      Past Medical History:   Diagnosis Date    Diabetes mellitus, type 2     GERD (gastroesophageal reflux disease)     Hyperlipidemia     Hypertension      Past Surgical History:   Procedure Laterality Date    CHOLECYSTECTOMY      FRACTURE SURGERY      HYSTERECTOMY       Family History   Problem Relation Name Age of Onset    Diabetes Father      Hypertension Father      Diabetes Mellitus Paternal Grandmother      Hypertension Paternal Grandmother       Social History[1]  Review of Systems   Constitutional: Negative.    HENT: Negative.     Eyes: Negative.    Respiratory: Negative.     Cardiovascular: Negative.    Gastrointestinal: Negative.    Endocrine: Negative.    Genitourinary: Negative.    Musculoskeletal: Negative.    Skin:  Positive for rash.        Papulovesicular rash noted to bilateral lower extremities   Allergic/Immunologic: Negative.    Neurological: Negative.    Hematological: Negative.    Psychiatric/Behavioral: Negative.         Physical Exam     Initial Vitals [07/23/25 2126]   BP Pulse Resp Temp SpO2   (!) 140/98 100 16 98.3 °F (36.8 °C) 98 %      MAP       --         Physical Exam    Nursing note and vitals reviewed.  Constitutional: Vital signs are normal. She appears well-developed and well-nourished. She is not diaphoretic. She is  cooperative.  Non-toxic appearance. She does not have a sickly appearance. She does not appear ill. No distress.   Cardiovascular:  Normal rate, regular rhythm, S1 normal, S2 normal, normal heart sounds and normal pulses.           Pulmonary/Chest: Effort normal and breath sounds normal.     Lymphadenopathy:     She has no cervical adenopathy.     She has no axillary adenopathy.   Neurological: She is alert and oriented to person, place, and time. She has normal strength and normal reflexes. She displays normal reflexes. No cranial nerve deficit or sensory deficit. She displays a negative Romberg sign. GCS eye subscore is 4. GCS verbal subscore is 5. GCS motor subscore is 6.   Skin: Skin is warm, dry and intact. Capillary refill takes less than 2 seconds. Rash noted.        Psychiatric: She has a normal mood and affect. Her speech is normal and behavior is normal. Judgment and thought content normal. Cognition and memory are normal.         Medical Screening Exam   See Full Note    ED Course   Procedures  Labs Reviewed - No data to display       Imaging Results    None          Medications   diphenhydrAMINE injection 50 mg (has no administration in time range)     Medical Decision Making  DC is a 45 y/o WF with a PMHx of T2DM, GERD, HLD, and HTN who presents to the ED POV with c/o itching and rash to bilateral lower extremities.  Patient is said her current symptoms started 3 days ago while helping a friend clean out her mother's yd.  Patient stated that she was standing in knee high to waist high grass.  Patient stated that she noticed the rash and started itching after returning home that evening.  Patient has been taking Benadryl at home.    Dx:  Chigger bites  Allergic Reaction  Poison ivy   Poison    Risk  Prescription drug management.  Risk Details: Low               ED Course as of 07/23/25 2153 Wed Jul 23, 2025 2151 Discharge instructions given along with strict return precautions, patient verbalizes  understanding.   [AC]      ED Course User Index  [AC] Rodney Smith FNP                           Clinical Impression:   Final diagnoses:  [B88.0] Chigger bites (Primary)        ED Disposition Condition    Discharge Stable          ED Prescriptions       Medication Sig Dispense Start Date End Date Auth. Provider    hydrocortisone 2.5 % cream Apply topically 2 (two) times daily. 20 g 7/23/2025 -- Rodney Smith FNP    amoxicillin-clavulanate 875-125mg (AUGMENTIN) 875-125 mg per tablet Take 1 tablet by mouth 2 (two) times daily. 14 tablet 7/23/2025 -- Rodney Smith FNP          Follow-up Information       Follow up With Specialties Details Why Contact Info    Desiree Parra FNP Family Medicine  As needed [address] [place] [id number] [phone]                 [1]   Social History  Tobacco Use    Smoking status: Every Day     Current packs/day: 0.50     Average packs/day: 0.5 packs/day for 4.6 years (2.3 ttl pk-yrs)     Types: Cigarettes     Start date: 2021     Passive exposure: Current    Smokeless tobacco: Never    Tobacco comments:     Taught on the effects smoking has on the lungs.   Substance Use Topics    Alcohol use: Not Currently    Drug use: Yes     Types: Marijuana        Rodney Smith FNP  07/23/25 1775

## 2025-07-24 NOTE — DISCHARGE INSTRUCTIONS
- wash with 1 cup of bleach in her bath water for the next several days.  - take medication as directed by the label on the bottle.  - apply medication as directed by label on the tube.  - continue to take Benadryl 25-50 mg every 6 hours as needed for itching.  - follow up with your local PCP as needed if symptoms continue.  - return to the emergency department if symptoms worsen.

## 2025-08-06 ENCOUNTER — PATIENT MESSAGE (OUTPATIENT)
Facility: HOSPITAL | Age: 45
End: 2025-08-06
Payer: MEDICAID

## 2025-08-06 DIAGNOSIS — E11.65 TYPE 2 DIABETES MELLITUS WITH HYPERGLYCEMIA, WITHOUT LONG-TERM CURRENT USE OF INSULIN: ICD-10-CM

## 2025-08-14 RX ORDER — METFORMIN HYDROCHLORIDE 500 MG/1
500 TABLET, EXTENDED RELEASE ORAL 2 TIMES DAILY WITH MEALS
Qty: 180 TABLET | Refills: 0 | Status: SHIPPED | OUTPATIENT
Start: 2025-08-14 | End: 2025-08-15 | Stop reason: SDUPTHER

## 2025-08-15 ENCOUNTER — APPOINTMENT (OUTPATIENT)
Dept: RADIOLOGY | Facility: CLINIC | Age: 45
End: 2025-08-15
Attending: NURSE PRACTITIONER
Payer: MEDICAID

## 2025-08-15 ENCOUNTER — OFFICE VISIT (OUTPATIENT)
Dept: FAMILY MEDICINE | Facility: CLINIC | Age: 45
End: 2025-08-15
Payer: MEDICAID

## 2025-08-15 VITALS
SYSTOLIC BLOOD PRESSURE: 130 MMHG | RESPIRATION RATE: 18 BRPM | HEIGHT: 62 IN | TEMPERATURE: 98 F | DIASTOLIC BLOOD PRESSURE: 91 MMHG | OXYGEN SATURATION: 97 % | WEIGHT: 234.81 LBS | HEART RATE: 94 BPM | BODY MASS INDEX: 43.21 KG/M2

## 2025-08-15 DIAGNOSIS — M79.601 PAIN IN RIGHT ARM: ICD-10-CM

## 2025-08-15 DIAGNOSIS — I10 HYPERTENSION, UNSPECIFIED TYPE: ICD-10-CM

## 2025-08-15 DIAGNOSIS — J01.00 ACUTE NON-RECURRENT MAXILLARY SINUSITIS: ICD-10-CM

## 2025-08-15 DIAGNOSIS — E78.2 MIXED HYPERLIPIDEMIA: ICD-10-CM

## 2025-08-15 DIAGNOSIS — E11.65 TYPE 2 DIABETES MELLITUS WITH HYPERGLYCEMIA, WITHOUT LONG-TERM CURRENT USE OF INSULIN: Primary | ICD-10-CM

## 2025-08-15 LAB
ALBUMIN SERPL BCP-MCNC: 3.8 G/DL (ref 3.5–5)
ALBUMIN/GLOB SERPL: 1 {RATIO}
ALP SERPL-CCNC: 124 U/L (ref 40–150)
ALT SERPL W P-5'-P-CCNC: 25 U/L
ANION GAP SERPL CALCULATED.3IONS-SCNC: 14 MMOL/L (ref 7–16)
AST SERPL W P-5'-P-CCNC: 48 U/L (ref 11–45)
BASOPHILS # BLD AUTO: 0.14 K/UL (ref 0–0.2)
BASOPHILS NFR BLD AUTO: 1.4 % (ref 0–1)
BILIRUB SERPL-MCNC: 0.4 MG/DL
BUN SERPL-MCNC: 19 MG/DL (ref 7–19)
BUN/CREAT SERPL: 26 (ref 6–20)
CALCIUM SERPL-MCNC: 9.8 MG/DL (ref 8.4–10.2)
CHLORIDE SERPL-SCNC: 103 MMOL/L (ref 98–107)
CHOLEST SERPL-MCNC: 161 MG/DL
CHOLEST/HDLC SERPL: 4.6 {RATIO}
CO2 SERPL-SCNC: 26 MMOL/L (ref 22–29)
CREAT SERPL-MCNC: 0.73 MG/DL (ref 0.55–1.02)
DIFFERENTIAL METHOD BLD: ABNORMAL
EGFR (NO RACE VARIABLE) (RUSH/TITUS): 105 ML/MIN/1.73M2
EOSINOPHIL # BLD AUTO: 0.45 K/UL (ref 0–0.5)
EOSINOPHIL NFR BLD AUTO: 4.6 % (ref 1–4)
ERYTHROCYTE [DISTWIDTH] IN BLOOD BY AUTOMATED COUNT: 13.8 % (ref 11.5–14.5)
EST. AVERAGE GLUCOSE BLD GHB EST-MCNC: 223 MG/DL
GLOBULIN SER-MCNC: 3.9 G/DL (ref 2–4)
GLUCOSE SERPL-MCNC: 202 MG/DL (ref 74–100)
HBA1C MFR BLD HPLC: 9.4 %
HCT VFR BLD AUTO: 44.9 % (ref 38–47)
HDLC SERPL-MCNC: 35 MG/DL (ref 35–60)
HGB BLD-MCNC: 13.9 G/DL (ref 12–16)
IMM GRANULOCYTES # BLD AUTO: 0.03 K/UL (ref 0–0.04)
IMM GRANULOCYTES NFR BLD: 0.3 % (ref 0–0.4)
LDLC SERPL CALC-MCNC: 96 MG/DL
LDLC/HDLC SERPL: 2.7 {RATIO}
LYMPHOCYTES # BLD AUTO: 3.07 K/UL (ref 1–4.8)
LYMPHOCYTES NFR BLD AUTO: 31.3 % (ref 27–41)
MCH RBC QN AUTO: 28.7 PG (ref 27–31)
MCHC RBC AUTO-ENTMCNC: 31 G/DL (ref 32–36)
MCV RBC AUTO: 92.6 FL (ref 80–96)
MONOCYTES # BLD AUTO: 0.6 K/UL (ref 0–0.8)
MONOCYTES NFR BLD AUTO: 6.1 % (ref 2–6)
MPC BLD CALC-MCNC: 12.8 FL (ref 9.4–12.4)
NEUTROPHILS # BLD AUTO: 5.51 K/UL (ref 1.8–7.7)
NEUTROPHILS NFR BLD AUTO: 56.3 % (ref 53–65)
NONHDLC SERPL-MCNC: 126 MG/DL
NRBC # BLD AUTO: 0 X10E3/UL
NRBC, AUTO (.00): 0 %
PLATELET # BLD AUTO: 248 K/UL (ref 150–400)
POTASSIUM SERPL-SCNC: 3.8 MMOL/L (ref 3.5–5.1)
PROT SERPL-MCNC: 7.7 G/DL (ref 6.4–8.3)
RBC # BLD AUTO: 4.85 M/UL (ref 4.2–5.4)
SODIUM SERPL-SCNC: 139 MMOL/L (ref 136–145)
TRIGL SERPL-MCNC: 151 MG/DL (ref 37–140)
VLDLC SERPL-MCNC: 30 MG/DL
WBC # BLD AUTO: 9.8 K/UL (ref 4.5–11)

## 2025-08-15 PROCEDURE — 80061 LIPID PANEL: CPT | Mod: ,,, | Performed by: CLINICAL MEDICAL LABORATORY

## 2025-08-15 PROCEDURE — 1160F RVW MEDS BY RX/DR IN RCRD: CPT | Mod: CPTII,,, | Performed by: NURSE PRACTITIONER

## 2025-08-15 PROCEDURE — 73060 X-RAY EXAM OF HUMERUS: CPT | Mod: 26,RT,, | Performed by: RADIOLOGY

## 2025-08-15 PROCEDURE — 3046F HEMOGLOBIN A1C LEVEL >9.0%: CPT | Mod: CPTII,,, | Performed by: NURSE PRACTITIONER

## 2025-08-15 PROCEDURE — 99214 OFFICE O/P EST MOD 30 MIN: CPT | Mod: ,,, | Performed by: NURSE PRACTITIONER

## 2025-08-15 PROCEDURE — 83036 HEMOGLOBIN GLYCOSYLATED A1C: CPT | Mod: ,,, | Performed by: CLINICAL MEDICAL LABORATORY

## 2025-08-15 PROCEDURE — 80053 COMPREHEN METABOLIC PANEL: CPT | Mod: ,,, | Performed by: CLINICAL MEDICAL LABORATORY

## 2025-08-15 PROCEDURE — 73060 X-RAY EXAM OF HUMERUS: CPT | Mod: TC,RHCUB,RT | Performed by: NURSE PRACTITIONER

## 2025-08-15 PROCEDURE — 3080F DIAST BP >= 90 MM HG: CPT | Mod: CPTII,,, | Performed by: NURSE PRACTITIONER

## 2025-08-15 PROCEDURE — 85025 COMPLETE CBC W/AUTO DIFF WBC: CPT | Mod: ,,, | Performed by: CLINICAL MEDICAL LABORATORY

## 2025-08-15 PROCEDURE — 1159F MED LIST DOCD IN RCRD: CPT | Mod: CPTII,,, | Performed by: NURSE PRACTITIONER

## 2025-08-15 PROCEDURE — 3075F SYST BP GE 130 - 139MM HG: CPT | Mod: CPTII,,, | Performed by: NURSE PRACTITIONER

## 2025-08-15 PROCEDURE — 3008F BODY MASS INDEX DOCD: CPT | Mod: CPTII,,, | Performed by: NURSE PRACTITIONER

## 2025-08-15 RX ORDER — SEMAGLUTIDE 0.68 MG/ML
0.25 INJECTION, SOLUTION SUBCUTANEOUS
Qty: 4 EACH | Refills: 2 | Status: SHIPPED | OUTPATIENT
Start: 2025-08-15

## 2025-08-15 RX ORDER — AZITHROMYCIN 250 MG/1
TABLET, FILM COATED ORAL
Qty: 6 TABLET | Refills: 0 | Status: SHIPPED | OUTPATIENT
Start: 2025-08-15 | End: 2025-08-20

## 2025-08-15 RX ORDER — ONDANSETRON 4 MG/1
4 TABLET, ORALLY DISINTEGRATING ORAL EVERY 4 HOURS PRN
COMMUNITY
Start: 2025-08-04

## 2025-08-15 RX ORDER — ROSUVASTATIN CALCIUM 5 MG/1
5 TABLET, COATED ORAL NIGHTLY
Qty: 30 TABLET | Refills: 2 | Status: SHIPPED | OUTPATIENT
Start: 2025-08-15 | End: 2025-08-18

## 2025-08-15 RX ORDER — METFORMIN HYDROCHLORIDE 500 MG/1
500 TABLET, EXTENDED RELEASE ORAL 2 TIMES DAILY WITH MEALS
Qty: 180 TABLET | Refills: 1 | Status: SHIPPED | OUTPATIENT
Start: 2025-08-15 | End: 2026-08-15

## 2025-08-15 RX ORDER — NITROFURANTOIN 25; 75 MG/1; MG/1
100 CAPSULE ORAL 2 TIMES DAILY
COMMUNITY
Start: 2025-08-04 | End: 2025-08-15

## 2025-08-15 RX ORDER — HYDROCHLOROTHIAZIDE 25 MG/1
25 TABLET ORAL DAILY
Qty: 30 TABLET | Refills: 2 | Status: SHIPPED | OUTPATIENT
Start: 2025-08-15

## 2025-08-27 PROBLEM — M79.601 PAIN IN RIGHT ARM: Status: ACTIVE | Noted: 2025-08-27

## 2025-08-27 PROBLEM — E78.2 MIXED HYPERLIPIDEMIA: Status: ACTIVE | Noted: 2025-08-27
